# Patient Record
Sex: MALE | Race: WHITE | NOT HISPANIC OR LATINO
[De-identification: names, ages, dates, MRNs, and addresses within clinical notes are randomized per-mention and may not be internally consistent; named-entity substitution may affect disease eponyms.]

---

## 2021-03-01 PROBLEM — Z00.00 ENCOUNTER FOR PREVENTIVE HEALTH EXAMINATION: Status: ACTIVE | Noted: 2021-03-01

## 2021-03-02 ENCOUNTER — APPOINTMENT (OUTPATIENT)
Dept: UROLOGY | Facility: CLINIC | Age: 76
End: 2021-03-02
Payer: MEDICARE

## 2021-03-02 VITALS
HEART RATE: 88 BPM | HEIGHT: 73 IN | BODY MASS INDEX: 25.45 KG/M2 | WEIGHT: 192 LBS | SYSTOLIC BLOOD PRESSURE: 129 MMHG | DIASTOLIC BLOOD PRESSURE: 73 MMHG

## 2021-03-02 DIAGNOSIS — Z78.9 OTHER SPECIFIED HEALTH STATUS: ICD-10-CM

## 2021-03-02 DIAGNOSIS — Z87.39 PERSONAL HISTORY OF OTHER DISEASES OF THE MUSCULOSKELETAL SYSTEM AND CONNECTIVE TISSUE: ICD-10-CM

## 2021-03-02 DIAGNOSIS — Z87.898 PERSONAL HISTORY OF OTHER SPECIFIED CONDITIONS: ICD-10-CM

## 2021-03-02 DIAGNOSIS — N20.0 CALCULUS OF KIDNEY: ICD-10-CM

## 2021-03-02 PROCEDURE — 99203 OFFICE O/P NEW LOW 30 MIN: CPT

## 2021-03-02 NOTE — HISTORY OF PRESENT ILLNESS
[FreeTextEntry1] : This patient presents for evaluation of a PSA elevation.  His PSA was 4.48 last year and is up to 6.29 at this time.  He has some mild but chronic lower urinary tract issues with some frequency and slowing of the stream.

## 2021-03-02 NOTE — END OF VISIT
[FreeTextEntry3] : He will take a course of ciprofloxacin and the PSA will be repeated in 10 days.  He will follow-up with a transrectal ultrasound of the prostate gland following that

## 2021-03-03 ENCOUNTER — NON-APPOINTMENT (OUTPATIENT)
Age: 76
End: 2021-03-03

## 2021-03-03 RX ORDER — CEFUROXIME AXETIL 500 MG/1
500 TABLET ORAL
Qty: 20 | Refills: 0 | Status: DISCONTINUED | COMMUNITY
Start: 2021-03-03 | End: 2021-03-03

## 2021-03-14 LAB
APPEARANCE: CLEAR
BACTERIA: NEGATIVE
BILIRUBIN URINE: NEGATIVE
BLOOD URINE: NEGATIVE
COLOR: NORMAL
GLUCOSE QUALITATIVE U: NEGATIVE
HYALINE CASTS: 0 /LPF
KETONES URINE: NEGATIVE
LEUKOCYTE ESTERASE URINE: NEGATIVE
MICROSCOPIC-UA: NORMAL
NITRITE URINE: NEGATIVE
PH URINE: 6
PROTEIN URINE: NEGATIVE
PSA SERPL-MCNC: 7.81 NG/ML
RED BLOOD CELLS URINE: 1 /HPF
SPECIFIC GRAVITY URINE: 1.01
SQUAMOUS EPITHELIAL CELLS: 0 /HPF
UROBILINOGEN URINE: NORMAL
WHITE BLOOD CELLS URINE: 1 /HPF

## 2021-03-15 LAB — URINE CYTOLOGY: NORMAL

## 2021-03-25 ENCOUNTER — APPOINTMENT (OUTPATIENT)
Dept: UROLOGY | Facility: CLINIC | Age: 76
End: 2021-03-25
Payer: MEDICARE

## 2021-03-25 VITALS
HEIGHT: 73 IN | OXYGEN SATURATION: 99 % | SYSTOLIC BLOOD PRESSURE: 125 MMHG | DIASTOLIC BLOOD PRESSURE: 81 MMHG | WEIGHT: 192 LBS | HEART RATE: 86 BPM | BODY MASS INDEX: 25.45 KG/M2

## 2021-03-25 DIAGNOSIS — N13.8 BENIGN PROSTATIC HYPERPLASIA WITH LOWER URINARY TRACT SYMPMS: ICD-10-CM

## 2021-03-25 DIAGNOSIS — K92.1 MELENA: ICD-10-CM

## 2021-03-25 DIAGNOSIS — N40.1 BENIGN PROSTATIC HYPERPLASIA WITH LOWER URINARY TRACT SYMPMS: ICD-10-CM

## 2021-03-25 PROCEDURE — 76857 US EXAM PELVIC LIMITED: CPT

## 2021-03-25 PROCEDURE — 51741 ELECTRO-UROFLOWMETRY FIRST: CPT

## 2021-03-25 PROCEDURE — 76872 US TRANSRECTAL: CPT

## 2021-03-25 NOTE — HISTORY OF PRESENT ILLNESS
[FreeTextEntry1] : This patient presents today for a transrectal ultrasound of the prostate gland the evaluation of PSA which had risen.  He states that he only took 3 ciprofloxacin before he saw spots of blood in his stool and he assumed that this was the cause of the blood in the stool.  His PSA when checked again it is even higher to 7.81.

## 2021-03-25 NOTE — END OF VISIT
[FreeTextEntry3] : I prescribed doxycycline for 10 days and will repeat the PSA again.  He empties his bladder well but some hypoechoic areas are noted.  I have referred him to Dexter Howard M.D. for further evaluation of the blood in stool which might not actually be from the ciprofloxacin.

## 2021-04-08 ENCOUNTER — NON-APPOINTMENT (OUTPATIENT)
Age: 76
End: 2021-04-08

## 2021-04-08 LAB — PSA SERPL-MCNC: 7.92 NG/ML

## 2021-05-18 ENCOUNTER — APPOINTMENT (OUTPATIENT)
Dept: UROLOGY | Facility: CLINIC | Age: 76
End: 2021-05-18
Payer: MEDICARE

## 2021-05-18 PROCEDURE — 99213 OFFICE O/P EST LOW 20 MIN: CPT

## 2021-05-24 NOTE — HISTORY OF PRESENT ILLNESS
[FreeTextEntry1] : This patient presents for the evaluation of a PSA elevation.  This remains the same after second determination

## 2021-05-24 NOTE — END OF VISIT
[FreeTextEntry3] : Transperineal biopsy was recommended however the patient would prefer to have an MRI first and this was recommended

## 2021-05-27 ENCOUNTER — NON-APPOINTMENT (OUTPATIENT)
Age: 76
End: 2021-05-27

## 2021-05-31 ENCOUNTER — RESULT REVIEW (OUTPATIENT)
Age: 76
End: 2021-05-31

## 2021-05-31 ENCOUNTER — OUTPATIENT (OUTPATIENT)
Dept: OUTPATIENT SERVICES | Facility: HOSPITAL | Age: 76
LOS: 1 days | End: 2021-05-31
Payer: MEDICARE

## 2021-05-31 ENCOUNTER — APPOINTMENT (OUTPATIENT)
Dept: MRI IMAGING | Facility: CLINIC | Age: 76
End: 2021-05-31
Payer: MEDICARE

## 2021-05-31 DIAGNOSIS — R97.20 ELEVATED PROSTATE SPECIFIC ANTIGEN [PSA]: ICD-10-CM

## 2021-05-31 PROCEDURE — A9585: CPT

## 2021-05-31 PROCEDURE — 72197 MRI PELVIS W/O & W/DYE: CPT

## 2021-05-31 PROCEDURE — G1004: CPT

## 2021-05-31 PROCEDURE — 72197 MRI PELVIS W/O & W/DYE: CPT | Mod: 26,ME

## 2021-05-31 PROCEDURE — 76377 3D RENDER W/INTRP POSTPROCES: CPT

## 2021-05-31 PROCEDURE — 76377 3D RENDER W/INTRP POSTPROCES: CPT | Mod: 26

## 2021-06-07 ENCOUNTER — NON-APPOINTMENT (OUTPATIENT)
Age: 76
End: 2021-06-07

## 2021-06-08 ENCOUNTER — NON-APPOINTMENT (OUTPATIENT)
Age: 76
End: 2021-06-08

## 2021-06-18 ENCOUNTER — APPOINTMENT (OUTPATIENT)
Dept: UROLOGY | Facility: CLINIC | Age: 76
End: 2021-06-18
Payer: MEDICARE

## 2021-06-18 DIAGNOSIS — R97.20 ELEVATED PROSTATE, SPECIFIC ANTIGEN [PSA]: ICD-10-CM

## 2021-06-18 PROCEDURE — 99213 OFFICE O/P EST LOW 20 MIN: CPT

## 2021-06-19 PROBLEM — R97.20 PSA ELEVATION: Status: ACTIVE | Noted: 2021-03-02

## 2021-06-19 NOTE — END OF VISIT
[FreeTextEntry3] : PSA ellevation with positive MRI-H wishes to try obseravtion and will repeat the PSA in 4 months

## 2021-09-08 ENCOUNTER — NON-APPOINTMENT (OUTPATIENT)
Age: 76
End: 2021-09-08

## 2021-10-21 ENCOUNTER — NON-APPOINTMENT (OUTPATIENT)
Age: 76
End: 2021-10-21

## 2021-10-21 LAB — PSA SERPL-MCNC: 18 NG/ML

## 2021-10-21 RX ORDER — CIPROFLOXACIN HYDROCHLORIDE 500 MG/1
500 TABLET, FILM COATED ORAL
Qty: 20 | Refills: 0 | Status: COMPLETED | COMMUNITY
Start: 2021-03-02 | End: 2021-10-21

## 2021-10-21 RX ORDER — DOXYCYCLINE HYCLATE 100 MG/1
100 CAPSULE ORAL
Qty: 20 | Refills: 0 | Status: COMPLETED | COMMUNITY
Start: 2021-03-03 | End: 2021-10-21

## 2022-03-04 ENCOUNTER — APPOINTMENT (OUTPATIENT)
Dept: CT IMAGING | Facility: CLINIC | Age: 77
End: 2022-03-04
Payer: MEDICARE

## 2022-03-04 ENCOUNTER — OUTPATIENT (OUTPATIENT)
Dept: OUTPATIENT SERVICES | Facility: HOSPITAL | Age: 77
LOS: 1 days | End: 2022-03-04
Payer: MEDICARE

## 2022-03-04 DIAGNOSIS — R97.20 ELEVATED PROSTATE SPECIFIC ANTIGEN [PSA]: ICD-10-CM

## 2022-03-04 PROCEDURE — 82565 ASSAY OF CREATININE: CPT

## 2022-03-04 PROCEDURE — 74177 CT ABD & PELVIS W/CONTRAST: CPT | Mod: MH

## 2022-03-04 PROCEDURE — 74177 CT ABD & PELVIS W/CONTRAST: CPT | Mod: 26,MH

## 2022-03-13 ENCOUNTER — OUTPATIENT (OUTPATIENT)
Dept: OUTPATIENT SERVICES | Facility: HOSPITAL | Age: 77
LOS: 1 days | Discharge: HOME | End: 2022-03-13
Payer: MEDICARE

## 2022-03-14 ENCOUNTER — NON-APPOINTMENT (OUTPATIENT)
Age: 77
End: 2022-03-14

## 2022-03-14 ENCOUNTER — APPOINTMENT (OUTPATIENT)
Dept: RADIATION ONCOLOGY | Facility: CLINIC | Age: 77
End: 2022-03-14

## 2022-03-14 ENCOUNTER — APPOINTMENT (OUTPATIENT)
Dept: RADIATION ONCOLOGY | Facility: CLINIC | Age: 77
End: 2022-03-14
Payer: MEDICARE

## 2022-03-14 VITALS
BODY MASS INDEX: 24.12 KG/M2 | HEART RATE: 86 BPM | RESPIRATION RATE: 19 BRPM | OXYGEN SATURATION: 97 % | HEIGHT: 73 IN | DIASTOLIC BLOOD PRESSURE: 83 MMHG | SYSTOLIC BLOOD PRESSURE: 128 MMHG | WEIGHT: 182 LBS

## 2022-03-14 DIAGNOSIS — Z80.9 FAMILY HISTORY OF MALIGNANT NEOPLASM, UNSPECIFIED: ICD-10-CM

## 2022-03-14 DIAGNOSIS — C61 MALIGNANT NEOPLASM OF PROSTATE: ICD-10-CM

## 2022-03-14 PROCEDURE — 99204 OFFICE O/P NEW MOD 45 MIN: CPT | Mod: 25

## 2022-03-14 RX ORDER — GENTAMICIN SULFATE 60 MG/100ML
0.6-0.9 INJECTION, SOLUTION INTRAVENOUS
Refills: 0 | Status: ACTIVE | COMMUNITY

## 2022-03-14 RX ORDER — DOXYCYCLINE HYCLATE 100 MG/1
100 TABLET ORAL
Qty: 20 | Refills: 0 | Status: DISCONTINUED | COMMUNITY
Start: 2021-03-25 | End: 2022-03-14

## 2022-03-14 RX ORDER — METHOCARBAMOL/ASPIRIN 400-325 MG
TABLET ORAL
Refills: 0 | Status: ACTIVE | COMMUNITY

## 2022-03-14 NOTE — REVIEW OF SYSTEMS
[IPSS Score (0-40): ___] : IPSS score: [unfilled] [EPIC-CP Score (0-60): ___] : EPIC-CP score: [unfilled] [Constipation: Grade 1 - Occasional or intermittent symptoms; occasional use of stool softeners, laxatives, dietary modification, or enema] : Constipation: Grade 1 - Occasional or intermittent symptoms; occasional use of stool softeners, laxatives, dietary modification, or enema [Diarrhea: Grade 1 - Increase of <4 stools per day over baseline; mild increase in ostomy output compared to baseline] : Diarrhea: Grade 1 - Increase of <4 stools per day over baseline; mild increase in ostomy output compared to baseline [Hematuria: Grade 0] : Hematuria: Grade 0 [Urinary Incontinence: Grade 0] : Urinary Incontinence: Grade 0  [Urinary Retention: Grade 1 - Urinary, suprapubic or intermittent catheter placement not indicated; able to void with some residual] : Urinary Retention: Grade 1 - Urinary, suprapubic or intermittent catheter placement not indicated; able to void with some residual [Urinary Tract Pain: Grade 0] : Urinary Tract Pain: Grade 0 [Urinary Urgency: Grade 0] : Urinary Urgency: Grade 0 [Urinary Frequency: Grade 1 - Present] : Urinary Frequency: Grade 1 - Present [Ejaculation Disorder: Grade 1 - Diminished ejaculation] : Ejaculation Disorder: Grade 1 - Diminished ejaculation  [Erectile Dysfunction: Grade 1 - Decrease in erectile function (frequency or rigidity of erections) but intervention not indicated (e.g., medication or use of mechanical device, penile pump)] : Erectile Dysfunction: Grade 1 - Decrease in erectile function (frequency or rigidity of erections) but intervention not indicated (e.g., medication or use of mechanical device, penile pump)

## 2022-03-14 NOTE — HISTORY OF PRESENT ILLNESS
[FreeTextEntry1] : This is a 77 year old male diagnosed with prostate cancer in October 2021 referred by Dr. Lozano. \par \par He first presented to Dr. Carr in March 2021 with an elevated PSA of 6.29 ng/mL. Urinary frequency and weak stream noted at this time. Repeat PSA performed on 4/8/21 was 7.92 ng/mL. Biopsy was recommended at this time, however patient requested to have an MRI performed. \par \par Prostate MRI performed on 5/31/21 showed a 50 cc prostate. Lesion noted midline, posterior medial, midgland to apex, peripheral zone, measuring 29 x 16 x 19 mm. Tumor bulges, deforms, or obscures capsule- extracapsular extension.  Right-sided neurovascular bundle involvement. No seminal vesicle invasion. \par \par Patient chose active surveillance and to repeat PSA in 4 months. \par \par PSA on 10/19/21 was 18.00 ng/mL. He chose to review his options at this time. He then saw Dr. Johnson and was told on examination he had a "rock hard" prostate. \par \par PSA on 12/6/21 was 21.34 ng/mL.  PSA on 2/25/22 was 26.4.\par \par He had a prostate biopsy on 2/10/22 with Dr. Johnson. Pathology showed adenocarcinoma in 12 out of 12 cores. Shanthi score of 4+4=8 in 8 cores and 4+5=9 in 4 cores. Maximum core involvement 90%. \par \par CT A/P performed 3/4/22 showed no evidence of metastatic disease. However there is an indeterminate small left renal mass and right adrenal nodule versus nodular thickening. Bone scan performed 3/8/22 was negative for metastatic disease. \par \par He is receiving his first Firmagon injection with Dr. Lozano. Urinary frequency and nocturia noted (every 3 hours). Urinary retention noted at times. He presents to discuss the role of radiation therapy in his care.

## 2022-03-14 NOTE — DISEASE MANAGEMENT
[0] : M0 [>20] : >20 ng/mL [Biopsy] : Patient had a biopsy on [9] : Template Biopsy Shanthi Score: 9 [] : Patient had a Prostate MRI [5] : 5 [Extracapsular Extension] : Extracapsular extension [IIIC] : IIIC [BiopsyDate] : 02/22 [MeasuredProstateVolume] : 50 [TotalCores] : 12 [TotalPositiveCores] : 12 [MaxCoreInvolvement] : 90 [CTresults] : neg [BoneScanResults] : neg

## 2022-03-15 ENCOUNTER — NON-APPOINTMENT (OUTPATIENT)
Age: 77
End: 2022-03-15

## 2022-03-15 PROBLEM — C61 PROSTATE CANCER: Status: ACTIVE | Noted: 2022-03-15

## 2022-03-29 ENCOUNTER — NON-APPOINTMENT (OUTPATIENT)
Age: 77
End: 2022-03-29

## 2022-03-30 ENCOUNTER — NON-APPOINTMENT (OUTPATIENT)
Age: 77
End: 2022-03-30

## 2022-03-30 PROCEDURE — 55876 PLACE RT DEVICE/MARKER PROS: CPT

## 2022-03-30 PROCEDURE — 76872 US TRANSRECTAL: CPT | Mod: 26

## 2022-03-30 PROCEDURE — 55874 TPRNL PLMT BIODEGRDABL MATRL: CPT

## 2022-03-30 NOTE — DATA REVIEWED
[FreeTextEntry1] : In preparation for the procedure, he self-administered an enema one hour before leaving home and was NPO the night before procedure. He was prescribed a 3 days course of oral antibiotics twice daily to be started a day prior to the procedure.\par  Topical EUSEBIO cream was applied to the perineal area one hour prior to procedure. Patient was prescribed  Valium 5mg and Tylenol 650 mg upon arrival in the department one hour to procedure. Pt did not want to take Valium prior to procedure but did take the tylenol.\par Procedure risk and benefits were reviewed with patient and a written consent was obtained prior to procedure. A time out was observed with patient name, date of birth, procedure, position, and site verified. \par Patient was placed in a lithotomy position. Chloral prep was used to prep the skin. While maintaining aseptic technique, an ultrasound probe was inserted into the rectum to visualize the prostate. Less than 10 cc of Lidocaine 2% and sodium bicarbonate 8.4% was injected subcutaneously. Afterwards, 20 cc of Lidocaine and sodium bicarbonate was injected internally at the prostate apex and bilateral neurovascular bundles for the nerve block.\par Three fiducial markers were prepared on the sterile field. One fiducial marker was placed into each of the following sites: left lobe base, right lobe base, and apex, via 14 gauge needles under ultrasound guidance. \par Next, the hydrogel spacer kit was opened onto the sterile field and the hydrogel injection apparatus was prepared. An 18 gauge needle was positioned into the mid-line perirectal fat between the anterior rectal wall and prostate under ultrasound guidance. Less than 10 cc of saline was injected via the needle to hydrodissect the space and confirm proper placement in both axial and sagittal views. The syringe was aspirated to confirm the needle was extravascular. The syringe was replaced with the hydrogel injection apparatus and the gel was injected over about 10 seconds. The needle was then removed. There was minimal blood loss. The patient tolerated procedure well.\par Patient was transferred to the recovery area on a monitor. Vital signs were stable. He tolerated fluid and a snack by mouth and was made comfortable. He denied pain. Post procedure instruction were given and reviewed with patient. CT/SIM appointment was given. He was discharged home in a stable condition, accompanied by a friend.\par \par

## 2022-03-31 ENCOUNTER — NON-APPOINTMENT (OUTPATIENT)
Age: 77
End: 2022-03-31

## 2022-04-08 ENCOUNTER — EMERGENCY (EMERGENCY)
Facility: HOSPITAL | Age: 77
LOS: 0 days | Discharge: HOME | End: 2022-04-08
Attending: EMERGENCY MEDICINE
Payer: MEDICARE

## 2022-04-08 VITALS
TEMPERATURE: 98 F | HEIGHT: 72 IN | DIASTOLIC BLOOD PRESSURE: 63 MMHG | RESPIRATION RATE: 18 BRPM | SYSTOLIC BLOOD PRESSURE: 127 MMHG | HEART RATE: 93 BPM | OXYGEN SATURATION: 100 % | WEIGHT: 198.42 LBS

## 2022-04-08 VITALS — HEART RATE: 66 BPM | RESPIRATION RATE: 22 BRPM
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
## 2022-04-08 DIAGNOSIS — S09.90XA UNSPECIFIED INJURY OF HEAD, INITIAL ENCOUNTER: ICD-10-CM

## 2022-04-08 DIAGNOSIS — Z20.822 CONTACT WITH AND (SUSPECTED) EXPOSURE TO COVID-19: ICD-10-CM
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
## 2022-04-08 PROCEDURE — 73000 X-RAY EXAM OF COLLAR BONE: CPT | Mod: LT

## 2022-04-08 PROCEDURE — 73030 X-RAY EXAM OF SHOULDER: CPT | Mod: 26,LT

## 2022-04-08 PROCEDURE — 73030 X-RAY EXAM OF SHOULDER: CPT | Mod: LT

## 2022-04-08 PROCEDURE — 73000 X-RAY EXAM OF COLLAR BONE: CPT | Mod: 26,LT

## 2022-04-08 PROCEDURE — 72125 CT NECK SPINE W/O DYE: CPT | Mod: 26,MA

## 2022-04-08 PROCEDURE — U0005: CPT

## 2022-04-08 PROCEDURE — 70450 CT HEAD/BRAIN W/O DYE: CPT | Mod: 26,MA

## 2022-04-08 PROCEDURE — 99284 EMERGENCY DEPT VISIT MOD MDM: CPT | Mod: 25

## 2022-04-08 PROCEDURE — 99284 EMERGENCY DEPT VISIT MOD MDM: CPT

## 2022-04-08 PROCEDURE — 70450 CT HEAD/BRAIN W/O DYE: CPT | Mod: MA

## 2022-04-08 PROCEDURE — U0003: CPT

## 2022-04-08 PROCEDURE — 71045 X-RAY EXAM CHEST 1 VIEW: CPT | Mod: 26

## 2022-04-08 PROCEDURE — 77263 THER RADIOLOGY TX PLNG CPLX: CPT

## 2022-04-08 PROCEDURE — 72125 CT NECK SPINE W/O DYE: CPT | Mod: MA

## 2022-04-08 PROCEDURE — 71045 X-RAY EXAM CHEST 1 VIEW: CPT

## 2022-04-08 NOTE — ED PROVIDER NOTE - NSFOLLOWUPCLINICS_GEN_ALL_ED_FT
[facility]  Family Medicine  [address] [place] [address]  Phone: [phone]  Fax:

## 2022-04-08 NOTE — ED PROVIDER NOTE - PATIENT PORTAL LINK FT
You can access the FollowMyHealth Patient Portal offered by [facility] by registering at the following website: [email or web address]. By joining [employer]’s FollowMyHealth portal, you will also be able to view your health information using other applications (apps) compatible with our system.

## 2022-04-08 NOTE — ED PROVIDER NOTE - PROGRESS NOTE DETAILS
Brooke Hall for attending Dr. Ramon: XR for chest, clavicle, shoulder show no acute disease Brooke Hall for attending Dr. Ramon: offered pt to do labs and check urine he states it was a mechanical fall would like to go home and he just missed a step, ambulatory to bathroom. Pt lives by himself, offered to see  but decline. reviewed imaging with pt and discharge. pt nontender along clavicle

## 2022-04-08 NOTE — ED PROVIDER NOTE - PHYSICAL EXAMINATION
Constitutional: NAD AAOx3. elderly male laying in bed  Eyes: PERRLA EOMI  Head: Normocephalic atraumatic  Mouth: MMM  Cardiac: regular rate   Resp: Lungs CTAB  GI: Abd s/nt/nd, no rebound or guarding.  Neuro: awake, alert, moving all extremities, cranial nerves 2-12 intact, sensation intact, no dysmetria.  Skin: No rashes

## 2022-04-08 NOTE — ED PROVIDER NOTE - NS ED ROS FT
Constitutional: No fever or chills  Eyes: No visual changes  HEENT: No throat pain  CV: No chest pain  Resp: No SOB no cough  GI: No abd pain, nausea or vomiting  : No dysuria  MSK: +L shoulder pain  Skin: No rash  Neuro: No headache

## 2022-04-08 NOTE — ED PROVIDER NOTE - NSFOLLOWUPINSTRUCTIONS_ED_ALL_ED_FT
Fall Prevention in the Home, Adult      Falls can cause injuries and can affect people from all age groups. There are many simple things that you can do to make your home safe and to help prevent falls. Ask for help when making these changes, if needed.      What actions can I take to prevent falls?    General instructions     •Use good lighting in all rooms. Replace any light bulbs that burn out.      •Turn on lights if it is dark. Use night-lights.      •Place frequently used items in easy-to-reach places. Lower the shelves around your home if necessary.      •Set up furniture so that there are clear paths around it. Avoid moving your furniture around.      •Remove throw rugs and other tripping hazards from the floor.      •Avoid walking on wet floors.      •Fix any uneven floor surfaces.      •Add color or contrast paint or tape to grab bars and handrails in your home. Place contrasting color strips on the first and last steps of stairways.      •When you use a stepladder, make sure that it is completely opened and that the sides are firmly locked. Have someone hold the ladder while you are using it. Do not climb a closed stepladder.      •Be aware of any and all pets.        What can I do in the bathroom?                   •Keep the floor dry. Immediately clean up any water that spills onto the floor.      •Remove soap buildup in the tub or shower on a regular basis.      •Use non-skid mats or decals on the floor of the tub or shower.      •Attach bath mats securely with double-sided, non-slip rug tape.      •If you need to sit down while you are in the shower, use a plastic, non-slip stool.      •Install grab bars by the toilet and in the tub and shower. Do not use towel bars as grab bars.      What can I do in the bedroom?     •Make sure that a bedside light is easy to reach.      • Do not use oversized bedding that drapes onto the floor.      •Have a firm chair that has side arms to use for getting dressed.      What can I do in the kitchen?     •Clean up any spills right away.      •If you need to reach for something above you, use a sturdy step stool that has a grab bar.      •Keep electrical cables out of the way.      • Do not use floor polish or wax that makes floors slippery. If you must use wax, make sure that it is non-skid floor wax.      What can I do in the stairways?     • Do not leave any items on the stairs.      •Make sure that you have a light switch at the top of the stairs and the bottom of the stairs. Have them installed if you do not have them.      •Make sure that there are handrails on both sides of the stairs. Fix handrails that are broken or loose. Make sure that handrails are as long as the stairways.      •Install non-slip stair treads on all stairs in your home.      •Avoid having throw rugs at the top or bottom of stairways, or secure the rugs with carpet tape to prevent them from moving.      •Choose a carpet design that does not hide the edge of steps on the stairway.      •Check any carpeting to make sure that it is firmly attached to the stairs. Fix any carpet that is loose or worn.      What can I do on the outside of my home?     •Use bright outdoor lighting.      •Regularly repair the edges of walkways and driveways and fix any cracks.      •Remove high doorway thresholds.      •Trim any shrubbery on the main path into your home.      •Regularly check that handrails are securely fastened and in good repair. Both sides of any steps should have handrails.      •Install guardrails along the edges of any raised decks or porches.      •Clear walkways of debris and clutter, including tools and rocks.      •Have leaves, snow, and ice cleared regularly.      •Use sand or salt on walkways during winter months.      •In the garage, clean up any spills right away, including grease or oil spills.      What other actions can I take?     •Wear closed-toe shoes that fit well and support your feet. Wear shoes that have rubber soles or low heels.      •Use mobility aids as needed, such as canes, walkers, scooters, and crutches.      •Review your medicines with your health care provider. Some medicines can cause dizziness or changes in blood pressure, which increase your risk of falling.      Talk with your health care provider about other ways that you can decrease your risk of falls. This may include working with a physical therapist or  to improve your strength, balance, and endurance.      Where to find more information    •Centers for Disease Control and Prevention, [employer]AD[employer]: https://www.cdc.gov      •National [place] [employer] Aging: https://ul9bkym.shireen.nih.gov        Contact a health care provider if:    •You are afraid of falling at home.      •You feel weak, drowsy, or dizzy at home.      •You fall at home.        Summary    •There are many simple things that you can do to make your home safe and to help prevent falls.      •Ways to make your home safe include removing tripping hazards and installing grab bars in the bathroom.      •Ask for help when making these changes in your home.      This information is not intended to replace advice given to you by your health care provider. Make sure you discuss any questions you have with your health care provider.      Document Revised: 11/30/2018 Document Reviewed: 08/02/2018    Elsevier Patient Education © 2021 [employer]

## 2022-04-08 NOTE — ED ADULT TRIAGE NOTE - CHIEF COMPLAINT QUOTE
Pt BIBA for mechanical fall at home. Pt states trip and fall off third step striking left shoulder. Denies LOC or blood thinners. No obvious signs of trauma or deformity. MD Del Valle at bedside for evaluation. brought to CT.

## 2022-04-13 PROCEDURE — 77332 RADIATION TREATMENT AID(S): CPT

## 2022-04-13 NOTE — ED POST DISCHARGE NOTE - DETAILS
I spoke to pt, he will speak to his oncologist at his appt on 4/18 regarding cxr or CT. KRUNAL Jeronimo

## 2022-04-18 PROCEDURE — 77300 RADIATION THERAPY DOSE PLAN: CPT

## 2022-04-18 PROCEDURE — 77338 DESIGN MLC DEVICE FOR IMRT: CPT

## 2022-04-18 PROCEDURE — 77301 RADIOTHERAPY DOSE PLAN IMRT: CPT

## 2022-04-19 PROCEDURE — 77331 SPECIAL RADIATION DOSIMETRY: CPT

## 2022-05-04 PROCEDURE — G6002: CPT

## 2022-05-04 PROCEDURE — 77427 RADIATION TX MANAGEMENT X5: CPT

## 2022-05-04 PROCEDURE — G6015: CPT

## 2022-05-05 PROCEDURE — G6002: CPT

## 2022-05-05 PROCEDURE — G6015: CPT

## 2022-05-06 PROCEDURE — G6015: CPT

## 2022-05-06 PROCEDURE — G6002: CPT

## 2022-05-09 ENCOUNTER — NON-APPOINTMENT (OUTPATIENT)
Age: 77
End: 2022-05-09

## 2022-05-09 VITALS — RESPIRATION RATE: 18 BRPM | BODY MASS INDEX: 24.12 KG/M2 | HEART RATE: 76 BPM | HEIGHT: 73 IN | WEIGHT: 182 LBS

## 2022-05-09 PROCEDURE — G6015: CPT

## 2022-05-09 PROCEDURE — G6002: CPT

## 2022-05-09 NOTE — HISTORY OF PRESENT ILLNESS
[FreeTextEntry1] : This is a 77 year old male diagnosed with prostate cancer in October 2021 referred by Dr. Lozano. \par \par He first presented to Dr. Carr in March 2021 with an elevated PSA of 6.29 ng/mL. Urinary frequency and weak stream noted at this time. Repeat PSA performed on 4/8/21 was 7.92 ng/mL. Biopsy was recommended at this time, however patient requested to have an MRI performed. \par \par Prostate MRI performed on 5/31/21 showed a 50 cc prostate. Lesion noted midline, posterior medial, midgland to apex, peripheral zone, measuring 29 x 16 x 19 mm. Tumor bulges, deforms, or obscures capsule- extracapsular extension.  Right-sided neurovascular bundle involvement. No seminal vesicle invasion. \par \par Patient chose active surveillance and to repeat PSA in 4 months. \par \par PSA on 10/19/21 was 18.00 ng/mL. He chose to review his options at this time. He then saw Dr. Johnson and was told on examination he had a "rock hard" prostate. \par \par PSA on 12/6/21 was 21.34 ng/mL.  PSA on 2/25/22 was 26.4.\par \par He had a prostate biopsy on 2/10/22 with Dr. Johnson. Pathology showed adenocarcinoma in 12 out of 12 cores. Shanthi score of 4+4=8 in 8 cores and 4+5=9 in 4 cores. Maximum core involvement 90%. \par \par CT A/P performed 3/4/22 showed no evidence of metastatic disease. However there is an indeterminate small left renal mass and right adrenal nodule versus nodular thickening. Bone scan performed 3/8/22 was negative for metastatic disease. \par \par He is receiving his first Firmagon injection with Dr. Lozano. Urinary frequency and nocturia noted (every 3 hours). Urinary retention noted at times. He is currently undergoing RT.\par \par He presents for an OTV 3/26 fx. Denies urinary symptoms/side effects, no pain.

## 2022-05-09 NOTE — DISEASE MANAGEMENT
[Pathological] : TNM Stage: p [IIIC] : IIIC [TTNM] : 1 [NTNM] : 0 [MTNM] : 0 [de-identified] : 674 [de-identified] : 1081 [de-identified] : Prostate/SV/Nodes

## 2022-05-10 PROCEDURE — G6002: CPT

## 2022-05-10 PROCEDURE — 77336 RADIATION PHYSICS CONSULT: CPT

## 2022-05-10 PROCEDURE — G6015: CPT

## 2022-05-11 PROCEDURE — G6015: CPT

## 2022-05-11 PROCEDURE — 77427 RADIATION TX MANAGEMENT X5: CPT

## 2022-05-11 PROCEDURE — G6002: CPT

## 2022-05-12 PROCEDURE — G6015: CPT

## 2022-05-12 PROCEDURE — 77014: CPT

## 2022-05-13 PROCEDURE — G6002: CPT

## 2022-05-13 PROCEDURE — G6015: CPT

## 2022-05-16 ENCOUNTER — NON-APPOINTMENT (OUTPATIENT)
Age: 77
End: 2022-05-16

## 2022-05-16 VITALS
HEART RATE: 72 BPM | RESPIRATION RATE: 18 BRPM | HEIGHT: 73 IN | SYSTOLIC BLOOD PRESSURE: 108 MMHG | DIASTOLIC BLOOD PRESSURE: 60 MMHG | WEIGHT: 187 LBS | OXYGEN SATURATION: 99 % | BODY MASS INDEX: 24.78 KG/M2

## 2022-05-16 PROCEDURE — G6002: CPT

## 2022-05-16 PROCEDURE — G6015: CPT

## 2022-05-16 NOTE — PHYSICAL EXAM
[FreeTextEntry1] : WDWN.  In no acute distress.\par Skin in RT portals intact.\par No erythema.\par

## 2022-05-16 NOTE — DISEASE MANAGEMENT
[Pathological] : TNM Stage: p [IIIC] : IIIC [TTNM] : 1 [NTNM] : 0 [MTNM] : 0 [[place] [id number]] : 3718 [de-identified] : 9684 [de-identified] : Prostate/SV/Nodes

## 2022-05-16 NOTE — REVIEW OF SYSTEMS
[Constipation: Grade 1 - Occasional or intermittent symptoms; occasional use of stool softeners, laxatives, dietary modification, or enema] : Constipation: Grade 1 - Occasional or intermittent symptoms; occasional use of stool softeners, laxatives, dietary modification, or enema [Urinary Frequency: Grade 1 - Present] : Urinary Frequency: Grade 1 - Present

## 2022-05-17 PROCEDURE — G6015: CPT

## 2022-05-17 PROCEDURE — 77336 RADIATION PHYSICS CONSULT: CPT

## 2022-05-17 PROCEDURE — G6002: CPT

## 2022-05-18 PROCEDURE — 77427 RADIATION TX MANAGEMENT X5: CPT

## 2022-05-18 PROCEDURE — G6002: CPT

## 2022-05-18 PROCEDURE — G6015: CPT

## 2022-05-19 PROCEDURE — G6015: CPT

## 2022-05-19 PROCEDURE — 77014: CPT

## 2022-05-20 PROCEDURE — G6002: CPT

## 2022-05-20 PROCEDURE — G6015: CPT

## 2022-05-23 ENCOUNTER — NON-APPOINTMENT (OUTPATIENT)
Age: 77
End: 2022-05-23

## 2022-05-23 VITALS
WEIGHT: 187 LBS | OXYGEN SATURATION: 98 % | HEART RATE: 79 BPM | HEIGHT: 73 IN | BODY MASS INDEX: 24.78 KG/M2 | SYSTOLIC BLOOD PRESSURE: 110 MMHG | RESPIRATION RATE: 18 BRPM | DIASTOLIC BLOOD PRESSURE: 70 MMHG

## 2022-05-23 PROCEDURE — G6015: CPT

## 2022-05-23 PROCEDURE — G6002: CPT

## 2022-05-23 NOTE — HISTORY OF PRESENT ILLNESS
[FreeTextEntry1] : This is a 77 year old male diagnosed with prostate cancer in October 2021 referred by Dr. Lozano. \par \par He first presented to Dr. Carr in March 2021 with an elevated PSA of 6.29 ng/mL. Urinary frequency and weak stream noted at this time. Repeat PSA performed on 4/8/21 was 7.92 ng/mL. Biopsy was recommended at this time, however patient requested to have an MRI performed. \par \par Prostate MRI performed on 5/31/21 showed a 50 cc prostate. Lesion noted midline, posterior medial, midgland to apex, peripheral zone, measuring 29 x 16 x 19 mm. Tumor bulges, deforms, or obscures capsule- extracapsular extension.  Right-sided neurovascular bundle involvement. No seminal vesicle invasion. \par \par Patient chose active surveillance and to repeat PSA in 4 months. \par \par PSA on 10/19/21 was 18.00 ng/mL. He chose to review his options at this time. He then saw Dr. Johnson and was told on examination he had a "rock hard" prostate. \par \par PSA on 12/6/21 was 21.34 ng/mL.  PSA on 2/25/22 was 26.4.\par \par He had a prostate biopsy on 2/10/22 with Dr. Johnson. Pathology showed adenocarcinoma in 12 out of 12 cores. Shanthi score of 4+4=8 in 8 cores and 4+5=9 in 4 cores. Maximum core involvement 90%. \par \par CT A/P performed 3/4/22 showed no evidence of metastatic disease. However there is an indeterminate small left renal mass and right adrenal nodule versus nodular thickening. Bone scan performed 3/8/22 was negative for metastatic disease. \par \par He is receiving his first Firmagon injection with Dr. Lozano. Urinary frequency and nocturia noted (every 3 hours). Urinary retention noted at times. He is currently undergoing RT.\par \par He presents for an OTV 14/26 fx. Urinary frequency noted. Chronic constipation. He saw Dr. Lozano who gave him anticonstipation medications. States he does have some loose stools at times. He received Firmagon injection 5/16/22 with Dr. Lozano.

## 2022-05-23 NOTE — REVIEW OF SYSTEMS
[Constipation: Grade 1 - Occasional or intermittent symptoms; occasional use of stool softeners, laxatives, dietary modification, or enema] : Constipation: Grade 1 - Occasional or intermittent symptoms; occasional use of stool softeners, laxatives, dietary modification, or enema [Diarrhea: Grade 1 - Increase of <4 stools per day over baseline; mild increase in ostomy output compared to baseline] : Diarrhea: Grade 1 - Increase of <4 stools per day over baseline; mild increase in ostomy output compared to baseline [Hematuria: Grade 0] : Hematuria: Grade 0 [Urinary Incontinence: Grade 0] : Urinary Incontinence: Grade 0  [Urinary Retention: Grade 0] : Urinary Retention: Grade 0 [Urinary Tract Pain: Grade 0] : Urinary Tract Pain: Grade 0 [Urinary Urgency: Grade 0] : Urinary Urgency: Grade 0 [Urinary Frequency: Grade 1 - Present] : Urinary Frequency: Grade 1 - Present

## 2022-05-23 NOTE — DISEASE MANAGEMENT
[Pathological] : TNM Stage: p [IIIC] : IIIC [TTNM] : 1 [NTNM] : 0 [MTNM] : 0 [de-identified] : 0260 [de-identified] : 0455 [de-identified] : Prostate/SV/Nodes

## 2022-05-24 PROCEDURE — 77336 RADIATION PHYSICS CONSULT: CPT

## 2022-05-24 PROCEDURE — G6015: CPT

## 2022-05-24 PROCEDURE — G6002: CPT

## 2022-05-25 PROCEDURE — G6002: CPT

## 2022-05-25 PROCEDURE — 77427 RADIATION TX MANAGEMENT X5: CPT

## 2022-05-25 PROCEDURE — G6015: CPT

## 2022-05-26 PROCEDURE — G6015: CPT

## 2022-05-26 PROCEDURE — 77014: CPT

## 2022-05-27 PROCEDURE — G6002: CPT

## 2022-05-27 PROCEDURE — G6015: CPT

## 2022-05-31 PROCEDURE — G6015: CPT

## 2022-05-31 PROCEDURE — G6002: CPT

## 2022-06-01 ENCOUNTER — NON-APPOINTMENT (OUTPATIENT)
Age: 77
End: 2022-06-01

## 2022-06-01 VITALS
RESPIRATION RATE: 19 BRPM | WEIGHT: 187 LBS | DIASTOLIC BLOOD PRESSURE: 64 MMHG | HEART RATE: 73 BPM | SYSTOLIC BLOOD PRESSURE: 110 MMHG | HEIGHT: 73 IN | OXYGEN SATURATION: 98 % | BODY MASS INDEX: 24.78 KG/M2

## 2022-06-01 PROCEDURE — 77336 RADIATION PHYSICS CONSULT: CPT

## 2022-06-01 PROCEDURE — G6015: CPT

## 2022-06-01 PROCEDURE — G6002: CPT

## 2022-06-01 NOTE — DISEASE MANAGEMENT
[Pathological] : TNM Stage: p [IIIC] : IIIC [TTNM] : 1 [NTNM] : 0 [MTNM] : 0 [de-identified] : 2369 [de-identified] : 4166 [de-identified] : Prostate/SV/Nodes

## 2022-06-01 NOTE — REVIEW OF SYSTEMS
[Constipation: Grade 1 - Occasional or intermittent symptoms; occasional use of stool softeners, laxatives, dietary modification, or enema] : Constipation: Grade 1 - Occasional or intermittent symptoms; occasional use of stool softeners, laxatives, dietary modification, or enema [Diarrhea: Grade 1 - Increase of <4 stools per day over baseline; mild increase in ostomy output compared to baseline] : Diarrhea: Grade 1 - Increase of <4 stools per day over baseline; mild increase in ostomy output compared to baseline

## 2022-06-01 NOTE — HISTORY OF PRESENT ILLNESS
[FreeTextEntry1] : This is a 77 year old male diagnosed with prostate cancer in October 2021 referred by Dr. Lozano. \par \par He first presented to Dr. Carr in March 2021 with an elevated PSA of 6.29 ng/mL. Urinary frequency and weak stream noted at this time. Repeat PSA performed on 4/8/21 was 7.92 ng/mL. Biopsy was recommended at this time, however patient requested to have an MRI performed. \par \par Prostate MRI performed on 5/31/21 showed a 50 cc prostate. Lesion noted midline, posterior medial, midgland to apex, peripheral zone, measuring 29 x 16 x 19 mm. Tumor bulges, deforms, or obscures capsule- extracapsular extension.  Right-sided neurovascular bundle involvement. No seminal vesicle invasion. \par \par Patient chose active surveillance and to repeat PSA in 4 months. \par \par PSA on 10/19/21 was 18.00 ng/mL. He chose to review his options at this time. He then saw Dr. Johnson and was told on examination he had a "rock hard" prostate. \par \par PSA on 12/6/21 was 21.34 ng/mL.  PSA on 2/25/22 was 26.4.\par \par He had a prostate biopsy on 2/10/22 with Dr. Johnson. Pathology showed adenocarcinoma in 12 out of 12 cores. Shanthi score of 4+4=8 in 8 cores and 4+5=9 in 4 cores. Maximum core involvement 90%. \par \par CT A/P performed 3/4/22 showed no evidence of metastatic disease. However there is an indeterminate small left renal mass and right adrenal nodule versus nodular thickening. Bone scan performed 3/8/22 was negative for metastatic disease. \par \par He is receiving his first Firmagon injection with Dr. Lozano. Urinary frequency and nocturia noted (every 3 hours). Urinary retention noted at times. He is currently undergoing RT.\par \par He presents for an OTV 20/26 fx. Urinary frequency noted. Chronic constipation. He saw Dr. Lozano last week who gave him anticonstipation medications. States he does have some loose stools at times. He received Firmagon injection 5/16/22 with Dr. Lozano.

## 2022-06-02 ENCOUNTER — NON-APPOINTMENT (OUTPATIENT)
Age: 77
End: 2022-06-02

## 2022-06-02 PROCEDURE — G6002: CPT

## 2022-06-02 PROCEDURE — 77427 RADIATION TX MANAGEMENT X5: CPT

## 2022-06-02 PROCEDURE — G6015: CPT

## 2022-06-03 PROCEDURE — G6002: CPT

## 2022-06-03 PROCEDURE — G6015: CPT

## 2022-06-06 ENCOUNTER — NON-APPOINTMENT (OUTPATIENT)
Age: 77
End: 2022-06-06

## 2022-06-06 VITALS
BODY MASS INDEX: 24.78 KG/M2 | OXYGEN SATURATION: 98 % | HEART RATE: 70 BPM | DIASTOLIC BLOOD PRESSURE: 70 MMHG | RESPIRATION RATE: 18 BRPM | HEIGHT: 73 IN | WEIGHT: 187 LBS | SYSTOLIC BLOOD PRESSURE: 118 MMHG

## 2022-06-06 PROCEDURE — G6015: CPT

## 2022-06-06 PROCEDURE — 77014: CPT

## 2022-06-06 NOTE — HISTORY OF PRESENT ILLNESS
[FreeTextEntry1] : This is a 77 year old male diagnosed with prostate cancer in October 2021 referred by Dr. Lozano. \par \par He first presented to Dr. Carr in March 2021 with an elevated PSA of 6.29 ng/mL. Urinary frequency and weak stream noted at this time. Repeat PSA performed on 4/8/21 was 7.92 ng/mL. Biopsy was recommended at this time, however patient requested to have an MRI performed. \par \par Prostate MRI performed on 5/31/21 showed a 50 cc prostate. Lesion noted midline, posterior medial, midgland to apex, peripheral zone, measuring 29 x 16 x 19 mm. Tumor bulges, deforms, or obscures capsule- extracapsular extension.  Right-sided neurovascular bundle involvement. No seminal vesicle invasion. \par \par Patient chose active surveillance and to repeat PSA in 4 months. \par \par PSA on 10/19/21 was 18.00 ng/mL. He chose to review his options at this time. He then saw Dr. Johnson and was told on examination he had a "rock hard" prostate. \par \par PSA on 12/6/21 was 21.34 ng/mL.  PSA on 2/25/22 was 26.4.\par \par He had a prostate biopsy on 2/10/22 with Dr. Johnson. Pathology showed adenocarcinoma in 12 out of 12 cores. Shanthi score of 4+4=8 in 8 cores and 4+5=9 in 4 cores. Maximum core involvement 90%. \par \par CT A/P performed 3/4/22 showed no evidence of metastatic disease. However there is an indeterminate small left renal mass and right adrenal nodule versus nodular thickening. Bone scan performed 3/8/22 was negative for metastatic disease. \par \par He is receiving his first Firmagon injection with Dr. Lozano. Urinary frequency and nocturia noted (every 3 hours). Urinary retention noted at times. He is currently undergoing RT.\par \par He presents for an OTV 23/26 fx. Urinary frequency noted. Chronic constipation. Taking Senna which he states is helping. States he does have some loose stools at times. Met with nutritionist last week. He received Firmagon injection 5/16/22 with Dr. Lozano.

## 2022-06-06 NOTE — DISEASE MANAGEMENT
[Pathological] : TNM Stage: p [IIIC] : IIIC [TTNM] : 1 [NTNM] : 0 [MTNM] : 0 [de-identified] : 2653 [de-identified] : 6927 [de-identified] : Prostate/SV/Nodes

## 2022-06-07 PROCEDURE — G6015: CPT

## 2022-06-07 PROCEDURE — G6002: CPT

## 2022-06-08 PROCEDURE — 77336 RADIATION PHYSICS CONSULT: CPT

## 2022-06-08 PROCEDURE — G6015: CPT

## 2022-06-08 PROCEDURE — G6002: CPT

## 2022-06-09 ENCOUNTER — NON-APPOINTMENT (OUTPATIENT)
Age: 77
End: 2022-06-09

## 2022-06-09 PROBLEM — Z78.9 OTHER SPECIFIED HEALTH STATUS: Chronic | Status: ACTIVE | Noted: 2022-04-13

## 2022-06-09 PROCEDURE — G6002: CPT

## 2022-06-09 PROCEDURE — G6015: CPT

## 2022-07-13 ENCOUNTER — APPOINTMENT (OUTPATIENT)
Dept: RADIATION ONCOLOGY | Facility: CLINIC | Age: 77
End: 2022-07-13

## 2022-07-13 VITALS
BODY MASS INDEX: 23.88 KG/M2 | WEIGHT: 181 LBS | HEART RATE: 79 BPM | DIASTOLIC BLOOD PRESSURE: 74 MMHG | OXYGEN SATURATION: 97 % | RESPIRATION RATE: 16 BRPM | SYSTOLIC BLOOD PRESSURE: 124 MMHG

## 2022-07-13 PROCEDURE — 99024 POSTOP FOLLOW-UP VISIT: CPT

## 2022-07-13 NOTE — DISEASE MANAGEMENT
[1] : T1 [0] : M0 [>20] : >20 ng/mL [Biopsy] : Patient had a biopsy on [9] : Template Biopsy Shanthi Score: 9 [] : Patient had a Prostate MRI [IIIC] : IIIC [Active Surveillance] : Active Surveillance [Radiation Therapy] : Migrate  from Active Surveillance  to Radiation Therapy [Treatment with radiation therapy] : Treatment with radiation therapy [BiopsyDate] : 02/22 [TotalCores] : 12 [TotalPositiveCores] : 12 [MaxCoreInvolvement] : 90 [RadiationCompletedDate] : 06/22 [EBRTDose] : 1865 [EBRTFractions] : 26

## 2022-07-13 NOTE — REVIEW OF SYSTEMS
[Fatigue] : fatigue [Constipation: Grade 1 - Occasional or intermittent symptoms; occasional use of stool softeners, laxatives, dietary modification, or enema] : Constipation: Grade 1 - Occasional or intermittent symptoms; occasional use of stool softeners, laxatives, dietary modification, or enema [Hematuria: Grade 0] : Hematuria: Grade 0 [Urinary Incontinence: Grade 0] : Urinary Incontinence: Grade 0  [Urinary Retention: Grade 0] : Urinary Retention: Grade 0 [Urinary Tract Pain: Grade 0] : Urinary Tract Pain: Grade 0 [Urinary Urgency: Grade 0] : Urinary Urgency: Grade 0 [Urinary Frequency: Grade 1 - Present] : Urinary Frequency: Grade 1 - Present

## 2022-07-13 NOTE — HISTORY OF PRESENT ILLNESS
[FreeTextEntry1] : This is a 77 year old male diagnosed with prostate cancer in October 2021 referred by Dr. Lozano. \par \par He first presented to Dr. Carr in March 2021 with an elevated PSA of 6.29 ng/mL. Urinary frequency and weak stream noted at this time. Repeat PSA performed on 4/8/21 was 7.92 ng/mL. Biopsy was recommended at this time, however patient requested to have an MRI performed. \par \par Prostate MRI performed on 5/31/21 showed a 50 cc prostate. Lesion noted midline, posterior medial, midgland to apex, peripheral zone, measuring 29 x 16 x 19 mm. Tumor bulges, deforms, or obscures capsule- extracapsular extension.  Right-sided neurovascular bundle involvement. No seminal vesicle invasion. \par \par Patient chose active surveillance and to repeat PSA in 4 months. \par \par PSA on 10/19/21 was 18.00 ng/mL. He chose to review his options at this time. He then saw Dr. Johnson and was told on examination he had a "rock hard" prostate. \par \par PSA on 12/6/21 was 21.34 ng/mL.  PSA on 2/25/22 was 26.4.\par \par He had a prostate biopsy on 2/10/22 with Dr. Johnson. Pathology showed adenocarcinoma in 12 out of 12 cores. Shanthi score of 4+4=8 in 8 cores and 4+5=9 in 4 cores. Maximum core involvement 90%. \par \par CT A/P performed 3/4/22 showed no evidence of metastatic disease. However there is an indeterminate small left renal mass and right adrenal nodule versus nodular thickening. Bone scan performed 3/8/22 was negative for metastatic disease. \par \par He is receiving his first Firmagon injection with Dr. Lozano. Urinary frequency and nocturia noted (every 3 hours). Urinary retention noted at times. \par \par He was assessed to have prostate adenoca, stage IIC, T3N0M0, grade group 5, [place] score 9, 12 of 12 cores positive, PSA of 26.4. Patient has high volume, very high risk disease. He completed RT total dose of 7000 cGy in 26 fx to the prostate/SV/Nodes on 6/8/22. \par \par He presents for a one month PTE. Urinary frequency noted. Nocturia 3-4x. Denies hematuria or dysuria. Fatigue noted. Chronic constipation. Saw GI and was recommended to have a colonoscopy. Met with nutritionist last week. He received Firmagon injection 5/16/22 and 7/11/22 with Dr. Lozano.

## 2022-07-14 LAB — PSA SERPL-MCNC: 6.52 NG/ML

## 2022-11-10 ENCOUNTER — APPOINTMENT (OUTPATIENT)
Dept: RADIATION ONCOLOGY | Facility: CLINIC | Age: 77
End: 2022-11-10

## 2022-11-10 ENCOUNTER — NON-APPOINTMENT (OUTPATIENT)
Age: 77
End: 2022-11-10

## 2022-11-10 VITALS
OXYGEN SATURATION: 98 % | HEART RATE: 70 BPM | WEIGHT: 179 LBS | DIASTOLIC BLOOD PRESSURE: 64 MMHG | BODY MASS INDEX: 23.72 KG/M2 | HEIGHT: 73 IN | RESPIRATION RATE: 18 BRPM | SYSTOLIC BLOOD PRESSURE: 110 MMHG

## 2022-11-10 PROCEDURE — 99214 OFFICE O/P EST MOD 30 MIN: CPT

## 2022-11-10 RX ORDER — DIAZEPAM 5 MG/1
5 TABLET ORAL
Qty: 1 | Refills: 0 | Status: DISCONTINUED | COMMUNITY
Start: 2022-03-15 | End: 2022-11-10

## 2022-11-10 RX ORDER — METHOCARBAMOL 750 MG/1
750 TABLET, FILM COATED ORAL
Qty: 360 | Refills: 0 | Status: ACTIVE | COMMUNITY
Start: 2022-10-14

## 2022-11-10 RX ORDER — CIPROFLOXACIN HYDROCHLORIDE 500 MG/1
500 TABLET, FILM COATED ORAL
Qty: 10 | Refills: 0 | Status: DISCONTINUED | COMMUNITY
Start: 2022-03-22 | End: 2022-11-10

## 2022-11-10 NOTE — REVIEW OF SYSTEMS
[Constipation: Grade 1 - Occasional or intermittent symptoms; occasional use of stool softeners, laxatives, dietary modification, or enema] : Constipation: Grade 1 - Occasional or intermittent symptoms; occasional use of stool softeners, laxatives, dietary modification, or enema [Hematuria: Grade 0] : Hematuria: Grade 0 [Urinary Incontinence: Grade 0] : Urinary Incontinence: Grade 0  [Urinary Retention: Grade 0] : Urinary Retention: Grade 0 [Urinary Tract Pain: Grade 0] : Urinary Tract Pain: Grade 0 [Urinary Urgency: Grade 0] : Urinary Urgency: Grade 0 [Urinary Frequency: Grade 1 - Present] : Urinary Frequency: Grade 1 - Present

## 2022-11-10 NOTE — HISTORY OF PRESENT ILLNESS
[FreeTextEntry1] : This is a 77 year old male diagnosed with prostate cancer in October 2021 referred by Dr. Lozano. \par \par He first presented to Dr. Carr in March 2021 with an elevated PSA of 6.29 ng/mL. Urinary frequency and weak stream noted at this time. Repeat PSA performed on 4/8/21 was 7.92 ng/mL. Biopsy was recommended at this time, however patient requested to have an MRI performed. \par \par Prostate MRI performed on 5/31/21 showed a 50 cc prostate. Lesion noted midline, posterior medial, midgland to apex, peripheral zone, measuring 29 x 16 x 19 mm. Tumor bulges, deforms, or obscures capsule- extracapsular extension.  Right-sided neurovascular bundle involvement. No seminal vesicle invasion. \par \par Patient chose active surveillance and to repeat PSA in 4 months. \par \par PSA on 10/19/21 was 18.00 ng/mL. He chose to review his options at this time. He then saw Dr. Johnson and was told on examination he had a "rock hard" prostate. \par \par PSA on 12/6/21 was 21.34 ng/mL.  PSA on 2/25/22 was 26.4.\par \par He had a prostate biopsy on 2/10/22 with Dr. Johnson. Pathology showed adenocarcinoma in 12 out of 12 cores. Shanthi score of 4+4=8 in 8 cores and 4+5=9 in 4 cores. Maximum core involvement 90%. \par \par CT A/P performed 3/4/22 showed no evidence of metastatic disease. However there is an indeterminate small left renal mass and right adrenal nodule versus nodular thickening. Bone scan performed 3/8/22 was negative for metastatic disease. \par \par He is receiving his first Firmagon injection with Dr. Lozano. Urinary frequency and nocturia noted (every 3 hours). Urinary retention noted at times. \par \par He was assessed to have prostate adenoca, stage IIC, T3N0M0, grade group 5, [place] score 9, 12 of 12 cores positive, PSA of 26.4. Patient has high volume, very high risk disease. He completed RT total dose of 7000 cGy in 26 fx to the prostate/SV/Nodes on 6/8/22. \par \par PSA on 7/13/22 was 6.52. \par \par He presents for a 5 month follow up. Nocturia 3-4x. Denies hematuria or dysuria. Chronic constipation. Saw GI and was recommended to have a colonoscopy, underwent Cologuard which was negative. States he is taking herbal laxatives. He received Firmagon injection 5/16/22, 7/11/22, and 11/1/22 with Dr. Lozano.

## 2022-11-10 NOTE — DISEASE MANAGEMENT
[BiopsyDate] : 02/22 [TotalCores] : 12 [TotalPositiveCores] : 12 [MaxCoreInvolvement] : 90 [RadiationCompletedDate] : 06/22 [EBRTDose] : 7537 [EBRTFractions] : 26

## 2022-11-14 LAB — PSA SERPL-MCNC: 1.99 NG/ML

## 2023-05-08 ENCOUNTER — APPOINTMENT (OUTPATIENT)
Dept: RADIATION ONCOLOGY | Facility: CLINIC | Age: 78
End: 2023-05-08
Payer: MEDICARE

## 2023-05-08 VITALS
RESPIRATION RATE: 20 BRPM | DIASTOLIC BLOOD PRESSURE: 68 MMHG | HEART RATE: 73 BPM | OXYGEN SATURATION: 98 % | HEIGHT: 73 IN | SYSTOLIC BLOOD PRESSURE: 120 MMHG | WEIGHT: 185 LBS | BODY MASS INDEX: 24.52 KG/M2

## 2023-05-08 PROCEDURE — 99213 OFFICE O/P EST LOW 20 MIN: CPT

## 2023-05-08 NOTE — HISTORY OF PRESENT ILLNESS
[FreeTextEntry1] : This is a 78 year old male diagnosed with prostate cancer in October 2021 referred by Dr. Lozano. \par \par He first presented to Dr. Carr in March 2021 with an elevated PSA of 6.29 ng/mL. Urinary frequency and weak stream noted at this time. Repeat PSA performed on 4/8/21 was 7.92 ng/mL. Biopsy was recommended at this time, however patient requested to have an MRI performed. \par \par Prostate MRI performed on 5/31/21 showed a 50 cc prostate. Lesion noted midline, posterior medial, midgland to apex, peripheral zone, measuring 29 x 16 x 19 mm. Tumor bulges, deforms, or obscures capsule- extracapsular extension.  Right-sided neurovascular bundle involvement. No seminal vesicle invasion. \par \par Patient chose active surveillance and to repeat PSA in 4 months. \par \par PSA on 10/19/21 was 18.00 ng/mL. He chose to review his options at this time. He then saw Dr. Johnson and was told on examination he had a "rock hard" prostate. \par \par PSA on 12/6/21 was 21.34 ng/mL.  PSA on 2/25/22 was 26.4.\par \par He had a prostate biopsy on 2/10/22 with Dr. Johnson. Pathology showed adenocarcinoma in 12 out of 12 cores. Shanthi score of 4+4=8 in 8 cores and 4+5=9 in 4 cores. Maximum core involvement 90%. \par \par CT A/P performed 3/4/22 showed no evidence of metastatic disease. However there is an indeterminate small left renal mass and right adrenal nodule versus nodular thickening. Bone scan performed 3/8/22 was negative for metastatic disease. \par \par He is receiving his first Firmagon injection with Dr. Lozano. Urinary frequency and nocturia noted (every 3 hours). Urinary retention noted at times. \par \par He was assessed to have prostate adenoca, stage IIC, T3N0M0, grade group 5, [place] score 9, 12 of 12 cores positive, PSA of 26.4. Patient has high volume, very high risk disease. He completed RT total dose of 7000 cGy in 26 fx to the prostate/SV/Nodes on 6/8/22. \par \par PSA on 7/13/22 was 6.52. \par PSA on 11/10/22 was 1.99.\par \par He presents for an 11 month follow up. Nocturia 3-4x. Denies hematuria or dysuria. Chronic constipation. Saw GI and was recommended to have a colonoscopy, underwent Cologuard which was negative. States he is taking herbal laxatives. He next Firmagon injection is on 5/16/23. First injection given 5/16/22 with Dr. Lozano. States he had a PSA with Dr. Lozano last month and he thinks it was 0.7.

## 2023-05-08 NOTE — DISEASE MANAGEMENT
[1] : T1 [0] : M0 [>20] : >20 ng/mL [Biopsy] : Patient had a biopsy on [9] : Template Biopsy Shanthi Score: 9 [] : Patient had a Prostate MRI [IIIC] : IIIC [Active Surveillance] : Active Surveillance [Radiation Therapy] : Migrate  from Active Surveillance  to Radiation Therapy [Treatment with radiation therapy] : Treatment with radiation therapy [BiopsyDate] : 02/22 [TotalCores] : 12 [TotalPositiveCores] : 12 [MaxCoreInvolvement] : 90 [RadiationCompletedDate] : 06/22 [EBRTDose] : 4345 [EBRTFractions] : 26

## 2023-11-08 ENCOUNTER — APPOINTMENT (OUTPATIENT)
Dept: RADIATION ONCOLOGY | Facility: CLINIC | Age: 78
End: 2023-11-08

## 2023-11-16 ENCOUNTER — APPOINTMENT (OUTPATIENT)
Dept: RADIATION ONCOLOGY | Facility: CLINIC | Age: 78
End: 2023-11-16
Payer: MEDICARE

## 2023-11-16 ENCOUNTER — NON-APPOINTMENT (OUTPATIENT)
Age: 78
End: 2023-11-16

## 2023-11-16 VITALS
OXYGEN SATURATION: 95 % | WEIGHT: 190 LBS | HEIGHT: 73 IN | RESPIRATION RATE: 18 BRPM | DIASTOLIC BLOOD PRESSURE: 74 MMHG | BODY MASS INDEX: 25.18 KG/M2 | HEART RATE: 74 BPM | SYSTOLIC BLOOD PRESSURE: 122 MMHG

## 2023-11-16 PROCEDURE — 99213 OFFICE O/P EST LOW 20 MIN: CPT

## 2024-01-29 ENCOUNTER — APPOINTMENT (OUTPATIENT)
Dept: RADIATION ONCOLOGY | Facility: CLINIC | Age: 79
End: 2024-01-29
Payer: MEDICARE

## 2024-01-29 VITALS
WEIGHT: 191 LBS | BODY MASS INDEX: 25.31 KG/M2 | DIASTOLIC BLOOD PRESSURE: 70 MMHG | HEIGHT: 73 IN | SYSTOLIC BLOOD PRESSURE: 124 MMHG

## 2024-01-29 DIAGNOSIS — C79.51 MALIGNANT NEOPLASM OF PROSTATE: ICD-10-CM

## 2024-01-29 DIAGNOSIS — C61 MALIGNANT NEOPLASM OF PROSTATE: ICD-10-CM

## 2024-01-29 PROCEDURE — 99215 OFFICE O/P EST HI 40 MIN: CPT

## 2024-01-29 NOTE — HISTORY OF PRESENT ILLNESS
[FreeTextEntry1] : This is a 78 year old male diagnosed with prostate cancer in October 2021 referred by Dr. Lozano.   He first presented to Dr. Carr in March 2021 with an elevated PSA of 6.29 ng/mL. Urinary frequency and weak stream noted at this time. Repeat PSA performed on 4/8/21 was 7.92 ng/mL. Biopsy was recommended at this time, however patient requested to have an MRI performed.   Prostate MRI performed on 5/31/21 showed a 50 cc prostate. Lesion noted midline, posterior medial, midgland to apex, peripheral zone, measuring 29 x 16 x 19 mm. Tumor bulges, deforms, or obscures capsule- extracapsular extension.  Right-sided neurovascular bundle involvement. No seminal vesicle invasion.   Patient chose active surveillance and to repeat PSA in 4 months.   PSA on 10/19/21 was 18.00 ng/mL. He chose to review his options at this time. He then saw Dr. Johnson and was told on examination he had a "rock hard" prostate.   PSA on 12/6/21 was 21.34 ng/mL.  PSA on 2/25/22 was 26.4.  He had a prostate biopsy on 2/10/22 with Dr. Johnson. Pathology showed adenocarcinoma in 12 out of 12 cores. [place] score of 4+4=8 in 8 cores and 4+5=9 in 4 cores. Maximum core involvement 90%.   CT A/P performed 3/4/22 showed no evidence of metastatic disease. However there is an indeterminate small left renal mass and right adrenal nodule versus nodular thickening. Bone scan performed 3/8/22 was negative for metastatic disease.   He received his first Firmagon injection in 5/22 with Dr. Lozano. Urinary frequency and nocturia noted (every 3 hours). Urinary retention noted at times.   He was assessed to have prostate adenoca, stage IIC, T3N0M0, grade group 5, Shant[place] score 9, 12 of 12 cores positive, PSA of 26.4. Patient has high volume, very high risk disease. He completed RT total dose of 7000 cGy in 26 fx to the prostate/SV/Nodes on 6/8/22.   PSA on 7/13/22 was 6.52.  PSA on 11/10/22 was 1.99. PSA on 4/18/23 was 0.509 by Dr. Lozano. PSA on 5/16/23 was 0.419. PSA on 6/13/23 was 0.42. PSA on 7/11/23 was 0.50. PSA on 8/8/23 was 0.68. PSA on 9/5/23 was 0.87. PSA on 10/3/23 was 1.43. PSA on 10/31/23 was 1.69. PSA on 1/24/24 was 7.65.   Nocturia 3-4x. Refusing Flomax. Denies hematuria or dysuria. Chronic constipation for which he takes laxatives. Saw GI and was recommended to have a colonoscopy, underwent Cologuard which was negative. States he is taking herbal laxatives. His next Firmagon injection is at the end of the month. First injection given 5/16/22 with Dr. Lozano. Patient states he was taking pomegranate extract for heart health, but is upset to learn that it may increase testosterone.  He underwent PSMA PET scan due to increasing PSA on 1/11/24 showed focal intense uptake at T12 demonstrating moderate PSMA expression compatible with osseous metastatic lesion. Nonspecific minimally avid focus along the right anterior third rib compatible to blood pool this time.   He continues on Firmagon injections with Dr. Lozano. Denies pain. Feeling well. He presents to discuss the role of radiation therapy in his care.

## 2024-01-29 NOTE — DISEASE MANAGEMENT
[Clinical] : TNM Stage: c [FreeTextEntry4] : prostate adenoca [TTNM] : 3 [NTNM] : 0 [MTNM] : 1 [IV] : IV

## 2024-02-02 ENCOUNTER — OUTPATIENT (OUTPATIENT)
Dept: OUTPATIENT SERVICES | Facility: HOSPITAL | Age: 79
LOS: 1 days | End: 2024-02-02
Payer: MEDICARE

## 2024-02-02 ENCOUNTER — APPOINTMENT (OUTPATIENT)
Dept: MRI IMAGING | Facility: CLINIC | Age: 79
End: 2024-02-02
Payer: MEDICARE

## 2024-02-02 DIAGNOSIS — C61 MALIGNANT NEOPLASM OF PROSTATE: ICD-10-CM

## 2024-02-02 PROCEDURE — 72157 MRI CHEST SPINE W/O & W/DYE: CPT

## 2024-02-02 PROCEDURE — A9585: CPT

## 2024-02-02 PROCEDURE — 72157 MRI CHEST SPINE W/O & W/DYE: CPT | Mod: 26,MH

## 2024-02-05 PROCEDURE — 77263 THER RADIOLOGY TX PLNG CPLX: CPT

## 2024-02-05 PROCEDURE — 77333 RADIATION TREATMENT AID(S): CPT

## 2024-02-05 PROCEDURE — 77290 THER RAD SIMULAJ FIELD CPLX: CPT

## 2024-02-12 PROCEDURE — 77295 3-D RADIOTHERAPY PLAN: CPT

## 2024-02-12 PROCEDURE — 77334 RADIATION TREATMENT AID(S): CPT

## 2024-02-12 PROCEDURE — 77300 RADIATION THERAPY DOSE PLAN: CPT

## 2024-02-14 PROCEDURE — 77370 RADIATION PHYSICS CONSULT: CPT

## 2024-02-16 ENCOUNTER — NON-APPOINTMENT (OUTPATIENT)
Age: 79
End: 2024-02-16

## 2024-02-16 PROCEDURE — 77373 STRTCTC BDY RAD THER TX DLVR: CPT

## 2024-02-16 NOTE — VITALS
[Maximal Pain Intensity: 5/10] : 5/10 [90: Able to carry normal activity; minor signs or symptoms of disease.] : 90: Able to carry normal activity; minor signs or symptoms of disease.

## 2024-02-16 NOTE — HISTORY OF PRESENT ILLNESS
[FreeTextEntry1] : This is a 78 year old male diagnosed with prostate cancer in October 2021 referred by Dr. Lozano.   He first presented to Dr. Carr in March 2021 with an elevated PSA of 6.29 ng/mL. Urinary frequency and weak stream noted at this time. Repeat PSA performed on 4/8/21 was 7.92 ng/mL. Biopsy was recommended at this time, however patient requested to have an MRI performed.   Prostate MRI performed on 5/31/21 showed a 50 cc prostate. Lesion noted midline, posterior medial, midgland to apex, peripheral zone, measuring 29 x 16 x 19 mm. Tumor bulges, deforms, or obscures capsule- extracapsular extension.  Right-sided neurovascular bundle involvement. No seminal vesicle invasion.   Patient chose active surveillance and to repeat PSA in 4 months.   PSA on 10/19/21 was 18.00 ng/mL. He chose to review his options at this time. He then saw Dr. Johnson and was told on examination he had a "rock hard" prostate.   PSA on 12/6/21 was 21.34 ng/mL.  PSA on 2/25/22 was 26.4.  He had a prostate biopsy on 2/10/22 with Dr. Johnson. Pathology showed adenocarcinoma in 12 out of 12 cores. [place] score of 4+4=8 in 8 cores and 4+5=9 in 4 cores. Maximum core involvement 90%.   CT A/P performed 3/4/22 showed no evidence of metastatic disease. However there is an indeterminate small left renal mass and right adrenal nodule versus nodular thickening. Bone scan performed 3/8/22 was negative for metastatic disease.   He received his first Firmagon injection in 5/22 with Dr. Lozano. Urinary frequency and nocturia noted (every 3 hours). Urinary retention noted at times.   He was assessed to have prostate adenoca, stage IIC, T3N0M0, grade group 5, Shanthi score 9, 12 of 12 cores positive, PSA of 26.4. Patient has high volume, very high risk disease. He completed RT total dose of 7000 cGy in 26 fx to the prostate/SV/Nodes on 6/8/22.   PSA on 7/13/22 was 6.52.  PSA on 11/10/22 was 1.99. PSA on 4/18/23 was 0.509 by Dr. Lozano. PSA on 5/16/23 was 0.419. PSA on 6/13/23 was 0.42. PSA on 7/11/23 was 0.50. PSA on 8/8/23 was 0.68. PSA on 9/5/23 was 0.87. PSA on 10/3/23 was 1.43. PSA on 10/31/23 was 1.69. PSA on 1/24/24 was 7.65.   Nocturia 3-4x. Refusing Flomax. Denies hematuria or dysuria. Chronic constipation for which he takes laxatives. Saw GI and was recommended to have a colonoscopy, underwent Cologuard which was negative. States he is taking herbal laxatives. His next Firmagon injection is at the end of the month. First injection given 5/16/22 with Dr. Lozano. Patient states he was taking pomegranate extract for heart health, but is upset to learn that it may increase testosterone.  He underwent PSMA PET scan due to increasing PSA on 1/11/24 showed focal intense uptake at T12 demonstrating moderate PSMA expression compatible with osseous metastatic lesion. Nonspecific minimally avid focus along the right anterior third rib compatible to blood pool this time.   He continues on Firmagon injections with Dr. Lozano. Denies pain. Feeling well.   He was assessed to have high risk prostate cancer, stage [id number] at diagnosis in 2022, received definitve RT to prostate/SV/nodes and adjuvant ADT. 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  He is currently undergoing SBRT to T12.  He presents for an OTV 1/3 fx. Feeling well other than left shoulder pain. States he hurt his shoulder recently.

## 2024-02-16 NOTE — DISEASE MANAGEMENT
[Clinical] : TNM Stage: c [IV] : IV [FreeTextEntry4] : prostate adenoca [TTNM] : 3 [NTNM] : 0 [MTNM] : 1 [de-identified] : 132 [de-identified] : 4216 [de-identified] : T12

## 2024-02-20 PROCEDURE — 77373 STRTCTC BDY RAD THER TX DLVR: CPT

## 2024-02-22 PROCEDURE — 77435 SBRT MANAGEMENT: CPT

## 2024-02-22 PROCEDURE — 77336 RADIATION PHYSICS CONSULT: CPT

## 2024-02-22 PROCEDURE — 77373 STRTCTC BDY RAD THER TX DLVR: CPT

## 2024-03-22 ENCOUNTER — APPOINTMENT (OUTPATIENT)
Dept: RADIATION ONCOLOGY | Facility: CLINIC | Age: 79
End: 2024-03-22
Payer: MEDICARE

## 2024-03-22 ENCOUNTER — NON-APPOINTMENT (OUTPATIENT)
Age: 79
End: 2024-03-22

## 2024-03-22 VITALS
WEIGHT: 189 LBS | RESPIRATION RATE: 18 BRPM | DIASTOLIC BLOOD PRESSURE: 74 MMHG | OXYGEN SATURATION: 97 % | BODY MASS INDEX: 24.94 KG/M2 | HEART RATE: 78 BPM | SYSTOLIC BLOOD PRESSURE: 136 MMHG

## 2024-03-22 PROCEDURE — 99212 OFFICE O/P EST SF 10 MIN: CPT

## 2024-03-22 NOTE — HISTORY OF PRESENT ILLNESS
[FreeTextEntry1] : This is a 79 year old male diagnosed with prostate cancer in October 2021 referred by Dr. Lozano.   He first presented to Dr. Carr in March 2021 with an elevated PSA of 6.29 ng/mL. Urinary frequency and weak stream noted at this time. Repeat PSA performed on 4/8/21 was 7.92 ng/mL. Biopsy was recommended at this time, however patient requested to have an MRI performed.   Prostate MRI performed on 5/31/21 showed a 50 cc prostate. Lesion noted midline, posterior medial, midgland to apex, peripheral zone, measuring 29 x 16 x 19 mm. Tumor bulges, deforms, or obscures capsule- extracapsular extension.  Right-sided neurovascular bundle involvement. No seminal vesicle invasion.   Patient chose active surveillance and to repeat PSA in 4 months.   PSA on 10/19/21 was 18.00 ng/mL. He chose to review his options at this time. He then saw Dr. Johnson and was told on examination he had a "rock hard" prostate.   PSA on 12/6/21 was 21.34 ng/mL.  PSA on 2/25/22 was 26.4.  He had a prostate biopsy on 2/10/22 with Dr. Johnson. Pathology showed adenocarcinoma in 12 out of 12 cores. Snyder score of 4+4=8 in 8 cores and 4+5=9 in 4 cores. Maximum core involvement 90%.   CT A/P performed 3/4/22 showed no evidence of metastatic disease. However there is an indeterminate small left renal mass and right adrenal nodule versus nodular thickening. Bone scan performed 3/8/22 was negative for metastatic disease.   He received his first Firmagon injection in 5/22 with Dr. Lozano. Urinary frequency and nocturia noted (every 3 hours). Urinary retention noted at times.   He was assessed to have prostate adenoca, stage IIC, T3N0M0, grade group 5, Shanthi score 9, 12 of 12 cores positive, PSA of 26.4. Patient has high volume, very high risk disease. He completed RT total dose of 7000 cGy in 26 fx to the prostate/SV/Nodes on 6/8/22.   PSA on 7/13/22 was 6.52.  PSA on 11/10/22 was 1.99. PSA on 4/18/23 was 0.509 by Dr. Lozano. PSA on 5/16/23 was 0.419. PSA on 6/13/23 was 0.42. PSA on 7/11/23 was 0.50. PSA on 8/8/23 was 0.68. PSA on 9/5/23 was 0.87. PSA on 10/3/23 was 1.43. PSA on 10/31/23 was 1.69. PSA on 1/24/24 was 7.65.   Nocturia 3-4x. Refusing Flomax. Denies hematuria or dysuria. Chronic constipation for which he takes laxatives. Saw GI and was recommended to have a colonoscopy, underwent Cologuard which was negative. States he is taking herbal laxatives. His next Firmagon injection is at the end of the month. First injection given 5/16/22 with Dr. Lozano. Patient states he was taking pomegranate extract for heart health, but is upset to learn that it may increase testosterone.  He underwent PSMA PET scan due to increasing PSA on 1/11/24 showed focal intense uptake at T12 demonstrating moderate PSMA expression compatible with osseous metastatic lesion. Nonspecific minimally avid focus along the right anterior third rib compatible to blood pool this time.   He continues on Firmagon injections with Dr. Lozano. Denies pain. Feeling well.   He was assessed to have high risk prostate cancer, stage [id number] at diagnosis in 2022, received definitve RT to prostate/SV/nodes and adjuvant ADT. 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  He completed SBRT to T12, total dose of 2400 cGy in 3 fx, on 2/22/24.   He presents for a one month PTE. Feeling well other than left shoulder pain. Denies any back pain.  Reports has osteoarthritis and the Methocarbonate relieves pain. Reports urine frequency every two hours. Denies dysuria,nocturia or weak stream.

## 2024-03-22 NOTE — VITALS
[Pain Description/Quality: ___] : Pain description/quality: [unfilled] [Pain Duration: ___] : Pain duration: [unfilled] [Pain Location: ___] : Pain Location: [unfilled] [Pain Interferes with ADLs] : Pain interferes with activities of daily living. [80: Normal activity with effort; some signs or symptoms of disease.] : 80: Normal activity with effort; some signs or symptoms of disease.  [Maximal Pain Intensity: 3/10] : 3/10

## 2024-03-28 ENCOUNTER — NON-APPOINTMENT (OUTPATIENT)
Age: 79
End: 2024-03-28

## 2024-04-29 ENCOUNTER — NON-APPOINTMENT (OUTPATIENT)
Age: 79
End: 2024-04-29

## 2024-05-19 ENCOUNTER — NON-APPOINTMENT (OUTPATIENT)
Age: 79
End: 2024-05-19

## 2024-05-20 ENCOUNTER — APPOINTMENT (OUTPATIENT)
Dept: RADIATION ONCOLOGY | Facility: CLINIC | Age: 79
End: 2024-05-20
Payer: MEDICARE

## 2024-05-20 VITALS
RESPIRATION RATE: 18 BRPM | WEIGHT: 191 LBS | HEIGHT: 73 IN | OXYGEN SATURATION: 96 % | BODY MASS INDEX: 25.31 KG/M2 | HEART RATE: 75 BPM | DIASTOLIC BLOOD PRESSURE: 70 MMHG | SYSTOLIC BLOOD PRESSURE: 118 MMHG

## 2024-05-20 PROCEDURE — 99213 OFFICE O/P EST LOW 20 MIN: CPT

## 2024-05-20 NOTE — HISTORY OF PRESENT ILLNESS
[FreeTextEntry1] : This is a 79 year old male diagnosed with prostate cancer in October 2021 referred by Dr. Lozano.   He first presented to Dr. Carr in March 2021 with an elevated PSA of 6.29 ng/mL. Urinary frequency and weak stream noted at this time. Repeat PSA performed on 4/8/21 was 7.92 ng/mL. Biopsy was recommended at this time, however patient requested to have an MRI performed.   Prostate MRI performed on 5/31/21 showed a 50 cc prostate. Lesion noted midline, posterior medial, midgland to apex, peripheral zone, measuring 29 x 16 x 19 mm. Tumor bulges, deforms, or obscures capsule- extracapsular extension.  Right-sided neurovascular bundle involvement. No seminal vesicle invasion.   Patient chose active surveillance and to repeat PSA in 4 months.   PSA on 10/19/21 was 18.00 ng/mL. He chose to review his options at this time. He then saw Dr. Johnson and was told on examination he had a "rock hard" prostate.   PSA on 12/6/21 was 21.34 ng/mL.  PSA on 2/25/22 was 26.4.  He had a prostate biopsy on 2/10/22 with Dr. Johnson. Pathology showed adenocarcinoma in 12 out of 12 cores. [place] score of 4+4=8 in 8 cores and 4+5=9 in 4 cores. Maximum core involvement 90%.   CT A/P performed 3/4/22 showed no evidence of metastatic disease. However there is an indeterminate small left renal mass and right adrenal nodule versus nodular thickening. Bone scan performed 3/8/22 was negative for metastatic disease.   He received his first Firmagon injection in 5/22 with Dr. Lozano. Urinary frequency and nocturia noted (every 3 hours). Urinary retention noted at times.   He was assessed to have prostate adenoca, stage IIC, T3N0M0, grade group 5, Shanthi score 9, 12 of 12 cores positive, PSA of 26.4. Patient has high volume, very high risk disease. He completed RT total dose of 7000 cGy in 26 fx to the prostate/SV/Nodes on 6/8/22.   PSA on 7/13/22 was 6.52.  PSA on 11/10/22 was 1.99. PSA on 4/18/23 was 0.509 by Dr. Lozano. PSA on 5/16/23 was 0.419. PSA on 6/13/23 was 0.42. PSA on 7/11/23 was 0.50. PSA on 8/8/23 was 0.68. PSA on 9/5/23 was 0.87. PSA on 10/3/23 was 1.43. PSA on 10/31/23 was 1.69. PSA on 1/24/24 was 7.65.   Nocturia 3-4x. Refusing Flomax. Denies hematuria or dysuria. Chronic constipation for which he takes laxatives. Saw GI and was recommended to have a colonoscopy, underwent Cologuard which was negative. States he is taking herbal laxatives. His next Firmagon injection is at the end of the month. First injection given 5/16/22 with Dr. Lozano. Patient states he was taking pomegranate extract for heart health, but is upset to learn that it may increase testosterone.  He underwent PSMA PET scan due to increasing PSA on 1/11/24 showed focal intense uptake at T12 demonstrating moderate PSMA expression compatible with osseous metastatic lesion. Nonspecific minimally avid focus along the right anterior third rib compatible to blood pool this time.   He continues on Firmagon injections with Dr. Lozano. Denies pain. Feeling well.   He was assessed to have high risk prostate cancer, stage [id number] at diagnosis in 2022, received definitve RT to prostate/SV/nodes and adjuvant ADT. 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  He completed SBRT to T12, total dose of 2400 cGy in 3 fx, on 2/22/24.   2/21/24- 8.760 3/27/24- 13.10 4/24/24- 7.84.  He presents for follow up. Denies any back pain. Reports has osteoarthritis and the Methocarbonate relieves pain. Reports urine frequency every two hours. Denies dysuria, nocturia or weak stream. Starting Zytiga soon with Dr. Lozano.

## 2024-12-07 ENCOUNTER — EMERGENCY (EMERGENCY)
Facility: HOSPITAL | Age: 79
LOS: 0 days | Discharge: HOME | End: 2024-12-07
Attending: EMERGENCY MEDICINE
Payer: MEDICARE

## 2024-12-07 VITALS
SYSTOLIC BLOOD PRESSURE: 148 MMHG | OXYGEN SATURATION: 100 % | RESPIRATION RATE: 18 BRPM | DIASTOLIC BLOOD PRESSURE: 77 MMHG | HEART RATE: 68 BPM

## 2024-12-07 VITALS
TEMPERATURE: 98 F | SYSTOLIC BLOOD PRESSURE: 139 MMHG | OXYGEN SATURATION: 100 % | RESPIRATION RATE: 18 BRPM | DIASTOLIC BLOOD PRESSURE: 88 MMHG | HEART RATE: 73 BPM

## 2024-12-07 DIAGNOSIS — R04.0 EPISTAXIS: ICD-10-CM

## 2024-12-07 DIAGNOSIS — Z86.718 PERSONAL HISTORY OF OTHER VENOUS THROMBOSIS AND EMBOLISM: ICD-10-CM

## 2024-12-07 DIAGNOSIS — Z85.46 PERSONAL HISTORY OF MALIGNANT NEOPLASM OF PROSTATE: ICD-10-CM

## 2024-12-07 DIAGNOSIS — Z79.01 LONG TERM (CURRENT) USE OF ANTICOAGULANTS: ICD-10-CM

## 2024-12-07 PROCEDURE — 30901 CONTROL OF NOSEBLEED: CPT | Mod: LT

## 2024-12-07 PROCEDURE — 99282 EMERGENCY DEPT VISIT SF MDM: CPT | Mod: 25

## 2024-12-07 PROCEDURE — 99283 EMERGENCY DEPT VISIT LOW MDM: CPT | Mod: 25

## 2024-12-07 NOTE — ED PROCEDURE NOTE - PROCEDURE ADDITIONAL DETAILS
Gauze soaked in 1% lidocaine with epinephrine placed in left naris for approximately 25 minutes.  Upon removal, there was no active bleeding or source that could be cauterized.

## 2024-12-07 NOTE — ED ADULT NURSE NOTE - NSFALLRISKINTERV_ED_ALL_ED

## 2024-12-07 NOTE — ED ADULT NURSE NOTE - OBJECTIVE STATEMENT
pt. is a 80 y/o male a&o x4 BIBEMS complaining of epistaxis to left nostril.  as per pt. bleeding began 20 min PTA. Pt packed L nare, called EMS who applied ice pack to nose en route. Pt states he is on blood thinner, but unsure of name. Packing remains in nare, no bleeding around packing at this time. Pt endorses allergies to meds, unable to name which meds. MD Talbert at bedside, nostril redressed.

## 2024-12-07 NOTE — ED PROVIDER NOTE - CLINICAL SUMMARY MEDICAL DECISION MAKING FREE TEXT BOX
See procedure note   epistaxis controlled after gauze soaked in 1% lidocaine with epinephrine laced in left naris for approximately 25 minutes.  After removal, there was no active bleeding.  Patient given return precautions and advised to follow-up with ENT.

## 2024-12-07 NOTE — ED PROVIDER NOTE - CARE PROVIDER_API CALL
Darryn Joyce  Otolaryngology  [address] [place] [id number]  Phone: [phone]  Fax: [phone]  Follow Up Time: 4-6 Days

## 2024-12-07 NOTE — ED PROVIDER NOTE - PATIENT PORTAL LINK FT
You can access the FollowMyHealth Patient Portal offered by [facility] by registering at the following website: [email or web address]. By joining [facility]’s FollowMyHealth portal, you will also be able to view your health information using other applications (apps) compatible with our system.

## 2024-12-07 NOTE — ED PROVIDER NOTE - OBJECTIVE STATEMENT
79-year-old male with history of prostate cancer, DVT on Eliquis presents for spontaneous epistaxis of the left naris noted just prior to arrival.  Patient states that this occurred when he was sitting on the toilet to have a bowel movement.  Patient denies any recent trauma.  Placed gauze of his left naris to control the bleeding.  Patient denies any shortness of breath.  Patient denies any significant blood in the posterior oropharynx.

## 2024-12-07 NOTE — ED ADULT TRIAGE NOTE - CHIEF COMPLAINT QUOTE
Pt BIBEMS from home c/o epistaxis x20 mins PTA. Pt packed L nare, called EMS who applied ice pack to nose en route. Pt states he is on bloodthinner, but unsure of name. Packing remains in nare, no bleeding around packing at this time. Pt endorses allergies to meds, unable to name which meds.

## 2024-12-07 NOTE — ED PROVIDER NOTE - NSICDXPASTMEDICALHX_GEN_ALL_CORE_FT
PAST MEDICAL HISTORY:  DVT, lower extremity     No pertinent past medical history     Prostate cancer

## 2024-12-09 RX ORDER — PREDNISONE 20 MG/1
1 TABLET ORAL
Refills: 0 | DISCHARGE
Start: 2024-12-09

## 2024-12-12 ENCOUNTER — INPATIENT (INPATIENT)
Facility: HOSPITAL | Age: 79
LOS: 4 days | Discharge: NURSING, REHAB OR LONG-TERM CARE | DRG: 947 | End: 2024-12-17
Attending: STUDENT IN AN ORGANIZED HEALTH CARE EDUCATION/TRAINING PROGRAM | Admitting: STUDENT IN AN ORGANIZED HEALTH CARE EDUCATION/TRAINING PROGRAM
Payer: MEDICARE

## 2024-12-12 VITALS
DIASTOLIC BLOOD PRESSURE: 71 MMHG | WEIGHT: 173.06 LBS | RESPIRATION RATE: 18 BRPM | OXYGEN SATURATION: 95 % | HEART RATE: 92 BPM | SYSTOLIC BLOOD PRESSURE: 139 MMHG | TEMPERATURE: 98 F

## 2024-12-12 PROCEDURE — 99285 EMERGENCY DEPT VISIT HI MDM: CPT

## 2024-12-12 NOTE — ED ADULT TRIAGE NOTE - CHIEF COMPLAINT QUOTE
Pt BIBEMS from home with c/o AMS since unknown time. Per EMS, pt was repeatedly calling friend and not making sense stating he's been on the toilet since last night. EMS reports police showed up to house for welfare check and found pt sitting on toilet. Skin tear noted to palm of left hand from "holding onto rails". Pt endorsing right foot pain. B/L feet appear edematous and pale with b/l mottled lower extremities. Pt unable to lift up b/l LE. BEFAST NEGATIVE. MD Jimenez called for eval, no code called.  in triage, A&Ox3, endorses allergies but pt and EMS unsure to which medications. hx of cancer and parkinson's.

## 2024-12-13 DIAGNOSIS — R41.82 ALTERED MENTAL STATUS, UNSPECIFIED: ICD-10-CM

## 2024-12-13 PROBLEM — I82.409 ACUTE EMBOLISM AND THROMBOSIS OF UNSPECIFIED DEEP VEINS OF UNSPECIFIED LOWER EXTREMITY: Chronic | Status: ACTIVE | Noted: 2024-12-07

## 2024-12-13 PROBLEM — C61 MALIGNANT NEOPLASM OF PROSTATE: Chronic | Status: ACTIVE | Noted: 2024-12-07

## 2024-12-13 LAB
ALBUMIN SERPL ELPH-MCNC: 3.5 G/DL — SIGNIFICANT CHANGE UP (ref 3.3–5)
ALP SERPL-CCNC: 62 U/L — SIGNIFICANT CHANGE UP (ref 40–120)
ALT FLD-CCNC: 37 U/L — SIGNIFICANT CHANGE UP (ref 12–78)
ANION GAP SERPL CALC-SCNC: 6 MMOL/L — SIGNIFICANT CHANGE UP (ref 5–17)
APPEARANCE UR: ABNORMAL
APTT BLD: 29.1 SEC — SIGNIFICANT CHANGE UP (ref 24.5–35.6)
AST SERPL-CCNC: 128 U/L — HIGH (ref 15–37)
BACTERIA # UR AUTO: NEGATIVE /HPF — SIGNIFICANT CHANGE UP
BASOPHILS # BLD AUTO: 0.02 K/UL — SIGNIFICANT CHANGE UP (ref 0–0.2)
BASOPHILS NFR BLD AUTO: 0.2 % — SIGNIFICANT CHANGE UP (ref 0–2)
BILIRUB SERPL-MCNC: 1.3 MG/DL — HIGH (ref 0.2–1.2)
BILIRUB UR-MCNC: NEGATIVE — SIGNIFICANT CHANGE UP
BUN SERPL-MCNC: 17 MG/DL — SIGNIFICANT CHANGE UP (ref 7–23)
CALCIUM SERPL-MCNC: 9.2 MG/DL — SIGNIFICANT CHANGE UP (ref 8.5–10.1)
CAST: 1 /LPF — SIGNIFICANT CHANGE UP (ref 0–4)
CHLORIDE SERPL-SCNC: 108 MMOL/L — SIGNIFICANT CHANGE UP (ref 96–108)
CO2 SERPL-SCNC: 26 MMOL/L — SIGNIFICANT CHANGE UP (ref 22–31)
COLOR SPEC: YELLOW — SIGNIFICANT CHANGE UP
CREAT SERPL-MCNC: 0.69 MG/DL — SIGNIFICANT CHANGE UP (ref 0.5–1.3)
DIFF PNL FLD: ABNORMAL
EGFR: 94 ML/MIN/1.73M2 — SIGNIFICANT CHANGE UP
EOSINOPHIL # BLD AUTO: 0.01 K/UL — SIGNIFICANT CHANGE UP (ref 0–0.5)
EOSINOPHIL NFR BLD AUTO: 0.1 % — SIGNIFICANT CHANGE UP (ref 0–6)
FLUAV AG NPH QL: SIGNIFICANT CHANGE UP
FLUBV AG NPH QL: SIGNIFICANT CHANGE UP
GLUCOSE SERPL-MCNC: 107 MG/DL — HIGH (ref 70–99)
GLUCOSE UR QL: NEGATIVE MG/DL — SIGNIFICANT CHANGE UP
HCT VFR BLD CALC: 37.4 % — LOW (ref 39–50)
HGB BLD-MCNC: 12.2 G/DL — LOW (ref 13–17)
IMM GRANULOCYTES NFR BLD AUTO: 0.6 % — SIGNIFICANT CHANGE UP (ref 0–0.9)
INR BLD: 1.24 RATIO — HIGH (ref 0.85–1.16)
KETONES UR-MCNC: 40 MG/DL
LACTATE SERPL-SCNC: 1.7 MMOL/L — SIGNIFICANT CHANGE UP (ref 0.7–2)
LEUKOCYTE ESTERASE UR-ACNC: NEGATIVE — SIGNIFICANT CHANGE UP
LYMPHOCYTES # BLD AUTO: 0.18 K/UL — LOW (ref 1–3.3)
LYMPHOCYTES # BLD AUTO: 1.9 % — LOW (ref 13–44)
MCHC RBC-ENTMCNC: 30.8 PG — SIGNIFICANT CHANGE UP (ref 27–34)
MCHC RBC-ENTMCNC: 32.6 G/DL — SIGNIFICANT CHANGE UP (ref 32–36)
MCV RBC AUTO: 94.4 FL — SIGNIFICANT CHANGE UP (ref 80–100)
MONOCYTES # BLD AUTO: 0.72 K/UL — SIGNIFICANT CHANGE UP (ref 0–0.9)
MONOCYTES NFR BLD AUTO: 7.5 % — SIGNIFICANT CHANGE UP (ref 2–14)
NEUTROPHILS # BLD AUTO: 8.63 K/UL — HIGH (ref 1.8–7.4)
NEUTROPHILS NFR BLD AUTO: 89.7 % — HIGH (ref 43–77)
NITRITE UR-MCNC: NEGATIVE — SIGNIFICANT CHANGE UP
PH UR: 5.5 — SIGNIFICANT CHANGE UP (ref 5–8)
PLATELET # BLD AUTO: 116 K/UL — LOW (ref 150–400)
POTASSIUM SERPL-MCNC: 3 MMOL/L — LOW (ref 3.5–5.3)
POTASSIUM SERPL-SCNC: 3 MMOL/L — LOW (ref 3.5–5.3)
PROT SERPL-MCNC: 6.8 GM/DL — SIGNIFICANT CHANGE UP (ref 6–8.3)
PROT UR-MCNC: 30 MG/DL
PROTHROM AB SERPL-ACNC: 14.6 SEC — HIGH (ref 9.9–13.4)
RBC # BLD: 3.96 M/UL — LOW (ref 4.2–5.8)
RBC # FLD: 12.8 % — SIGNIFICANT CHANGE UP (ref 10.3–14.5)
RBC CASTS # UR COMP ASSIST: 51 /HPF — HIGH (ref 0–4)
RSV RNA NPH QL NAA+NON-PROBE: SIGNIFICANT CHANGE UP
SARS-COV-2 RNA SPEC QL NAA+PROBE: SIGNIFICANT CHANGE UP
SODIUM SERPL-SCNC: 140 MMOL/L — SIGNIFICANT CHANGE UP (ref 135–145)
SP GR SPEC: 1.01 — SIGNIFICANT CHANGE UP (ref 1–1.03)
SQUAMOUS # UR AUTO: 0 /HPF — SIGNIFICANT CHANGE UP (ref 0–5)
UROBILINOGEN FLD QL: 0.2 MG/DL — SIGNIFICANT CHANGE UP (ref 0.2–1)
WBC # BLD: 9.62 K/UL — SIGNIFICANT CHANGE UP (ref 3.8–10.5)
WBC # FLD AUTO: 9.62 K/UL — SIGNIFICANT CHANGE UP (ref 3.8–10.5)
WBC UR QL: 0 /HPF — SIGNIFICANT CHANGE UP (ref 0–5)

## 2024-12-13 PROCEDURE — 99222 1ST HOSP IP/OBS MODERATE 55: CPT

## 2024-12-13 PROCEDURE — 97116 GAIT TRAINING THERAPY: CPT | Mod: GP

## 2024-12-13 PROCEDURE — 80053 COMPREHEN METABOLIC PANEL: CPT

## 2024-12-13 PROCEDURE — 85027 COMPLETE CBC AUTOMATED: CPT

## 2024-12-13 PROCEDURE — 70450 CT HEAD/BRAIN W/O DYE: CPT | Mod: 26,MC

## 2024-12-13 PROCEDURE — 83540 ASSAY OF IRON: CPT

## 2024-12-13 PROCEDURE — 36415 COLL VENOUS BLD VENIPUNCTURE: CPT

## 2024-12-13 PROCEDURE — 97110 THERAPEUTIC EXERCISES: CPT | Mod: GP

## 2024-12-13 PROCEDURE — 71260 CT THORAX DX C+: CPT | Mod: 26,MC

## 2024-12-13 PROCEDURE — 71045 X-RAY EXAM CHEST 1 VIEW: CPT | Mod: 26

## 2024-12-13 PROCEDURE — 85025 COMPLETE CBC W/AUTO DIFF WBC: CPT

## 2024-12-13 PROCEDURE — 74177 CT ABD & PELVIS W/CONTRAST: CPT | Mod: 26,MC

## 2024-12-13 PROCEDURE — 80048 BASIC METABOLIC PNL TOTAL CA: CPT

## 2024-12-13 PROCEDURE — 97166 OT EVAL MOD COMPLEX 45 MIN: CPT | Mod: GO

## 2024-12-13 RX ORDER — APIXABAN 2.5 MG/1
1 TABLET, FILM COATED ORAL
Refills: 0 | DISCHARGE

## 2024-12-13 RX ORDER — PREDNISONE 20 MG/1
5 TABLET ORAL
Refills: 0 | Status: DISCONTINUED | OUTPATIENT
Start: 2024-12-13 | End: 2024-12-17

## 2024-12-13 RX ORDER — APIXABAN 2.5 MG/1
5 TABLET, FILM COATED ORAL EVERY 12 HOURS
Refills: 0 | Status: DISCONTINUED | OUTPATIENT
Start: 2024-12-13 | End: 2024-12-17

## 2024-12-13 RX ORDER — ONDANSETRON HYDROCHLORIDE 4 MG/1
4 TABLET, FILM COATED ORAL EVERY 8 HOURS
Refills: 0 | Status: DISCONTINUED | OUTPATIENT
Start: 2024-12-13 | End: 2024-12-17

## 2024-12-13 RX ORDER — POTASSIUM CHLORIDE 600 MG/1
40 TABLET, EXTENDED RELEASE ORAL ONCE
Refills: 0 | Status: COMPLETED | OUTPATIENT
Start: 2024-12-13 | End: 2024-12-13

## 2024-12-13 RX ORDER — MAGNESIUM, ALUMINUM HYDROXIDE 200-225/5
30 SUSPENSION, ORAL (FINAL DOSE FORM) ORAL EVERY 4 HOURS
Refills: 0 | Status: DISCONTINUED | OUTPATIENT
Start: 2024-12-13 | End: 2024-12-17

## 2024-12-13 RX ORDER — ACETAMINOPHEN, DIPHENHYDRAMINE HCL, PHENYLEPHRINE HCL 325; 25; 5 MG/1; MG/1; MG/1
3 TABLET ORAL AT BEDTIME
Refills: 0 | Status: DISCONTINUED | OUTPATIENT
Start: 2024-12-13 | End: 2024-12-17

## 2024-12-13 RX ORDER — POTASSIUM CHLORIDE 600 MG/1
40 TABLET, EXTENDED RELEASE ORAL EVERY 4 HOURS
Refills: 0 | Status: COMPLETED | OUTPATIENT
Start: 2024-12-13 | End: 2024-12-13

## 2024-12-13 RX ORDER — ACETAMINOPHEN 500MG 500 MG/1
650 TABLET, COATED ORAL EVERY 6 HOURS
Refills: 0 | Status: DISCONTINUED | OUTPATIENT
Start: 2024-12-13 | End: 2024-12-17

## 2024-12-13 RX ORDER — INFLUENZA VIRUS VACCINE 15; 15; 15; 15 UG/.5ML; UG/.5ML; UG/.5ML; UG/.5ML
0.5 SUSPENSION INTRAMUSCULAR ONCE
Refills: 0 | Status: COMPLETED | OUTPATIENT
Start: 2024-12-13 | End: 2024-12-13

## 2024-12-13 RX ADMIN — POTASSIUM CHLORIDE 40 MILLIEQUIVALENT(S): 600 TABLET, EXTENDED RELEASE ORAL at 15:50

## 2024-12-13 RX ADMIN — POTASSIUM CHLORIDE 40 MILLIEQUIVALENT(S): 600 TABLET, EXTENDED RELEASE ORAL at 06:10

## 2024-12-13 NOTE — PHYSICAL THERAPY INITIAL EVALUATION ADULT - IMPAIRMENTS FOUND, PT EVAL
anthropometric characteristics/circulation/gait, locomotion, and balance/integumentary integrity/muscle strength/posture

## 2024-12-13 NOTE — PHYSICAL THERAPY INITIAL EVALUATION ADULT - IMPAIRMENTS CONTRIBUTING TO GAIT DEVIATIONS, PT EVAL
posture moderately stooped fwd /hunched ,slow deliberate progression, mild difficulty advancing R >L feet/impaired postural control

## 2024-12-13 NOTE — ED ADULT NURSE REASSESSMENT NOTE - NS ED NURSE REASSESS COMMENT FT1
plan of care assumed from RAJESH Pride. vital signs obtained and documented. pt. endorses no complaints at this time. pt. medicated with PO Potassium, able to tolerate swallowing well.

## 2024-12-13 NOTE — ED ADULT NURSE NOTE - OBJECTIVE STATEMENT
Pt presents to ED from home with c/o AMS. Pt states he felt weak and was unable to get up off the toilet today.     Pt BIBEMS from home with c/o AMS since unknown time. Per EMS, pt was repeatedly calling friend and not making sense stating he's been on the toilet since last night. EMS reports police showed up to house for welfare check and found pt sitting on toilet. Skin tear noted to palm of left hand from "holding onto rails". Pt endorsing right foot pain. B/L feet appear edematous and pale with b/l mottled lower extremities. Pt unable to lift up b/l LE. BEFAST NEGATIVE. MD Jimenez called for eval, no code called.  in triage, A&Ox3, endorses allergies but pt and EMS unsure to which medications. hx of cancer and parkinson's. Pt presents to ED from home with c/o AMS. Pt states he felt weak and was unable to get up off the toilet today. Pt has skin tear on L hand from holding onto the rails. Pt states BL feet and legs are hurting and causing the weakness.

## 2024-12-13 NOTE — PHYSICAL THERAPY INITIAL EVALUATION ADULT - SKIN TURGOR
IV site L dorsal distal FA dripping fresh red blood when L arm dependent , RN made aware and I bandaged the IV site ,pt very upset convinced the dripping blood is coming from his "butt" and wanted to stick his finger in his anus to check for blood from which I strongly discouraged hime/slow return to original state

## 2024-12-13 NOTE — PHYSICAL THERAPY INITIAL EVALUATION ADULT - LEVEL OF INDEPENDENCE: STAND/SIT, REHAB EVAL
Pt seated in high back chair after amb 15-20ft and was returned to the bedside in the chair ; completes chair to bed transfer with modA2/minimum assist (75% patients effort)/moderate assist (50% patients effort)

## 2024-12-13 NOTE — PHYSICAL THERAPY INITIAL EVALUATION ADULT - MODALITIES TREATMENT COMMENTS
pt attributes involuntary movements of head, tremors B hands to side-effect of Zytiga ( a medication prescribed for treatment of prostate CA )

## 2024-12-13 NOTE — ED PROVIDER NOTE - PROGRESS NOTE DETAILS
All labs personally reviewed, mild hypokalemia already repleted, labs otherwise nonactionable.  Urine with blood but no signs of infection.  Flu/COVID-negative.  CTs without any acute traumatic findings.  Does have incidental findings of a sclerotic lesion in pancreatic neck hypodensity.  Due to altered mental status, will admit to medicine for further management.  I, Sampson Dave, personally discussed this patient's care with Dr. Hamm who recommends admit to [facility]. DO Tenzin: Patient signed out to me by Dr. Jimenez. Patient is here with altered mental status. Plan of care is to follow-up CT. Disposition is admit.

## 2024-12-13 NOTE — ED PROVIDER NOTE - OBJECTIVE STATEMENT
79-year-old male with history of prostate cancer, DVT on Eliquis presents with concern for confusion.  Patient's friend was unable to reach patient today, police broke down the door and patient was found to be sitting on the toilet.  Patient was unsure how long he was in the bathroom.  Denies fall.  Patient without acute complaints.Denies fevers chills recent illness.

## 2024-12-13 NOTE — PATIENT PROFILE ADULT - FALL HARM RISK - ATTEMPT OOB
2020  1:42 PM    Patient contacted regarding recent discharge and COVID-19 risk. Discussed COVID-19 related testing which was not done at this time. Test results were not done. Patient informed of results, if available? N/A      Care Transition Nurse/ Ambulatory Care Manager/ LPN Care Coordinator contacted the patient by telephone to perform post discharge assessment. Verified name and  with patient as identifiers. Patient has following risk factors of: asthma. CTN/ACM/LPN reviewed discharge instructions, medical action plan and red flags related to discharge diagnosis. Reviewed and educated them on any new and changed medications related to discharge diagnosis; Rx - albuterol nebulizer, prednisone, tussi-organidin dm, atarax. Patient reports that he was able to pick-up albuterol nebulizer solution post-discharge; patient has not yet picked up prednisone or atarax. ACM advised patient that both prednisone and atarax were previously sent to [facility]; pt verbalizes understanding. Patient reports advised by pharmacy that tussi-organidin was OTC. Patient requests to complete remainder of initial post-discharge assessment at a later time. No

## 2024-12-13 NOTE — H&P ADULT - ASSESSMENT
80 yo M pmhx prostate cancer undergoing treatment, DVT on eliquis, hypotension BIBEMS for AMS after wellness check.     #AMS? (resolved)   Pt found during wellness check now at baseline mental status AOX3 though sometimes slow to respond and poor historian unable to remember all his medications. Lives alone.   - CTH unremarkable   - PT/OT eval   - social work consult     #Hx of DVT   - cw home eliquis     #Hx of Pancreatic cancer   Pt with hx of pancreatic cancer currently undergoing treatment able to state the names of some cancer medications but unable to remember his oncologist.   - heme onc consulted, appreciate recs     #Anemia   Baseline hgb unknown likely 2/2 to chronic illness (pancreatic cancer)   - trend H&H daily  - f/u iron panel     #Hypokalemia   - replete     #Code: Pt AOx3 would like to be DNR/DNI trial of NIV MOLST form filled out   #HCP: friend Regis Fraire     78 yo M pmhx prostate cancer undergoing treatment, DVT on eliquis, hypotension BIBEMS for AMS after wellness check.     #AMS? (resolved)   Pt found during wellness check now at baseline mental status AOX3 though sometimes slow to respond and poor historian unable to remember all his medications. Lives alone.   - CTH unremarkable   - PT/OT eval   - social work consult     #Hx of DVT   - cw home eliquis     #Hx of Pancreatic cancer   Pt with hx of pancreatic cancer currently undergoing treatment able to state the names of some cancer medications but unable to remember his oncologist.   - heme onc consulted, appreciate recs   - cw home prednisone unclear indication     #Anemia   Baseline hgb unknown likely 2/2 to chronic illness (pancreatic cancer)   - trend H&H daily  - f/u iron panel     #Hypokalemia   - replete     #Code: Pt AOx3 would like to be DNR/DNI trial of NIV MOLST form filled out   #HCP: friend Regis Fraire

## 2024-12-13 NOTE — ED ADULT NURSE REASSESSMENT NOTE - NS ED NURSE REASSESS COMMENT FT1
Pt is breathing unlabored and spontaneously, respirations even. Pt does not appear in discomfort or distress at this time. Endorsing no complaints at this time. Pt updated on plan of care. Safety and comfort maintained.

## 2024-12-13 NOTE — H&P ADULT - HISTORY OF PRESENT ILLNESS
HPI:80 yo M pmhx prostate cancer undergoing treatment, DVT on eliquis, hypotension BIBEMS after wellness check. Pt lives a lone has no next of kin neighbor called wellness check on patient because he had not seen him for a few days. Pt was found conscious sitting on the toilet but confused unsure of how long he had been in the bathroom. He denied any head strike, LOC. Pt with no acute complaints. CTH unremarkable and CT CAP notable for T12 lesion and minimal non specific pancreatic hypodensity. Pt evaluated bedside now AOX3 able to tell me name, date and where he is.         PAST MEDICAL & SURGICAL HISTORY:  Prostate cancer  DVT, lower extremity    FAMILY HISTORY:not pertinent     Social History:  lives alone no NOK     Allergies    Allergy Status Unknown    Intolerances        MEDICATIONS  (STANDING):  potassium chloride    Tablet ER 40 milliEquivalent(s) Oral every 4 hours    MEDICATIONS  (PRN):  acetaminophen     Tablet .. 650 milliGRAM(s) Oral every 6 hours PRN Temp greater or equal to 38C (100.4F), Mild Pain (1 - 3)  aluminum hydroxide/magnesium hydroxide/simethicone Suspension 30 milliLiter(s) Oral every 4 hours PRN Dyspepsia  melatonin 3 milliGRAM(s) Oral at bedtime PRN Insomnia  ondansetron Injectable 4 milliGRAM(s) IV Push every 8 hours PRN Nausea and/or Vomiting      ROS:  General:  No fevers, chills, or unexplained weight loss  Skin: No rash or bothersome skin lesions  Musculoskeletal: No arthalgias, myalgias or joint swelling  Eyes: No visual changes or eye pain  Ears: No hearing loss , otorrhea or ear pain  Nose, Mouth, Throat: No nasal congestion, rhinorrhea, oral lesions, postnasal drip or sore throat  Cardio: No chest pain or palpitations. no lower extremity edema. no syncope. no claudication.   Respiratory: No cough, shortness of breath or wheezing   GI: No diarrhea, constipation, blood in stools, abdominal pain, vomiting or heartburn  : No urinary frequency, hematuria, incontinence, or dysuria  Neurologic: No headaches, parasthesias, confusion, dysarthria or gait instability  Psychiatric:  No anxiety or depression  Lymphatic:  No easy bruising, easy bleeding or swollen glands  Allergic: No itching, sneezing , watery eyes, clear rhinorrhea or recurrent infections    PEx  T(C): 36.7 (12-13-24 @ 09:49), Max: 36.8 (24 @ 23:50)  HR: 76 (24 @ 09:49) (76 - 92)  BP: 134/70 (24 @ 09:49) (126/68 - 139/71)  RR: 18 (24 @ 09:49) (15 - 18)  SpO2: 99% (24 @ 09:49) (95% - 100%)  Wt(kg): --  General:     no acute distress, no identifying marks , scars, or tattoos.  Skin: no rash or prominent lesions  Head: normocephalic, atraumatic     Sinuses: non-tender  Nose: no external lesions, mucosa non-inflamed, septum and turbinates normal  Throat: no erythema, exudates or lesions.  Neck: Supple without lymphadenopathy. Thyroid no thyromegaly, no palpable thyroid nodules, no palpable nodules or masses, carotid arteries no bruits.   Heart: RRR, no murmur or gallop.  Normal S1, S2.  No S3, S4.   Lungs: CTA bilaterally, no wheezes, rhonchi, rales.  Breathing unlabored.   Chest wall: Normal insp   Abdomen:  Soft, NT/ND, normal bowel sounds, no HSM, no masses.  No peritoneal signs.   Back: spine normal without deformity or tenderness.  Normal ROM   Extremities: No deformities, clubbing, cyanosis, or edema.  Musculoskeletal: Normal gait and station. No decreased range of motion, instability, atrophy or abnormal strength or tone in the head, neck, spine, ribs, pelvis or extremities.   Neurologic: CN 2-12 normal. Sensation to pain, touch and proprioception normal. DTRs normal in upper and lower extremities. No pathologic reflexes.  Motor normal.  Psychiatric: Oriented X3, intact recent and remote memory, judgement and insight, normal mood and affect.                          12.2   9.62  )-----------( 116      ( 13 Dec 2024 00:36 )             37.4         140  |  108  |  17  ----------------------------<  107[H]  3.0[L]   |  26  |  0.69    Ca    9.2      13 Dec 2024 00:36    TPro  6.8  /  Alb  3.5  /  TBili  1.3[H]  /  DBili  x   /  AST  128[H]  /  ALT  37  /  AlkPhos  62  12-13    CAPILLARY BLOOD GLUCOSE      POCT Blood Glucose.: 101 mg/dL (12 Dec 2024 23:51)    PT/INR - ( 13 Dec 2024 01:46 )   PT: 14.6 sec;   INR: 1.24 ratio         PTT - ( 13 Dec 2024 01:46 )  PTT:29.1 sec  Urinalysis Basic - ( 13 Dec 2024 02:30 )    Color: Yellow / Appearance: Turbid / S.015 / pH: x  Gluc: x / Ketone: 40 mg/dL  / Bili: Negative / Urobili: 0.2 mg/dL   Blood: x / Protein: 30 mg/dL / Nitrite: Negative   Leuk Esterase: Negative / RBC: 51 /HPF / WBC 0 /HPF   Sq Epi: x / Non Sq Epi: 0 /HPF / Bacteria: Negative /HPF          Radiology/Imaging, I have personally reviewed:    CT CAP   IMPRESSION:  1.  No acute thoracoabdominal pathology.  2.  13 mm RIGHT T12 body sclerotic lesion (developed from 3/4/2022)   should be viewed with suspicion in patient with prostate cancer  3.  Minimal/ 7mm nonspecific pancreatic neck hypodensity ()and   adjacent 4 mm head cyst (). Correlation with comorbidities may   determine need for primary characterization with contrast MRI    EKG Sinus rhythmn with pvs prolonged qt qtc 504         HPI:78 yo M pmhx prostate cancer undergoing treatment, DVT on eliquis, hypotension BIBEMS after wellness check. Pt lives a lone has no next of kin neighbor called wellness check on patient because he had not seen him for a few days. Pt was found conscious sitting on the toilet but confused unsure of how long he had been in the bathroom. He denied any head strike, LOC. Pt with no acute complaints. CTH unremarkable and CT CAP notable for T12 lesion and minimal non specific pancreatic hypodensity. Pt evaluated bedside now AOX3 able to tell me name, date and where he is.         PAST MEDICAL & SURGICAL HISTORY:  Prostate cancer  DVT, lower extremity    FAMILY HISTORY:not pertinent     Social History:  lives alone no NOK     Allergies    Allergy Status Unknown    Intolerances        MEDICATIONS  (STANDING):  potassium chloride    Tablet ER 40 milliEquivalent(s) Oral every 4 hours    MEDICATIONS  (PRN):  acetaminophen     Tablet .. 650 milliGRAM(s) Oral every 6 hours PRN Temp greater or equal to 38C (100.4F), Mild Pain (1 - 3)  aluminum hydroxide/magnesium hydroxide/simethicone Suspension 30 milliLiter(s) Oral every 4 hours PRN Dyspepsia  melatonin 3 milliGRAM(s) Oral at bedtime PRN Insomnia  ondansetron Injectable 4 milliGRAM(s) IV Push every 8 hours PRN Nausea and/or Vomiting      ROS:  General:  No fevers, chills, or unexplained weight loss  Skin: No rash or bothersome skin lesions  Musculoskeletal: No arthalgias, myalgias or joint swelling  Eyes: No visual changes or eye pain  Ears: No hearing loss , otorrhea or ear pain  Nose, Mouth, Throat: No nasal congestion, rhinorrhea, oral lesions, postnasal drip or sore throat  Cardio: No chest pain or palpitations. no lower extremity edema. no syncope. no claudication.   Respiratory: No cough, shortness of breath or wheezing   GI: No diarrhea, constipation, blood in stools, abdominal pain, vomiting or heartburn  : No urinary frequency, hematuria, incontinence, or dysuria  Neurologic: No headaches, parasthesias, confusion, dysarthria or gait instability  Psychiatric:  No anxiety or depression  Lymphatic:  No easy bruising, easy bleeding or swollen glands  Allergic: No itching, sneezing , watery eyes, clear rhinorrhea or recurrent infections    PEx  T(C): 36.7 (12-13-24 @ 09:49), Max: 36.8 (24 @ 23:50)  HR: 76 (24 @ 09:49) (76 - 92)  BP: 134/70 (24 @ 09:49) (126/68 - 139/71)  RR: 18 (24 @ 09:49) (15 - 18)  SpO2: 99% (24 @ 09:49) (95% - 100%)  Wt(kg): --  General:     no acute distress, no identifying marks , scars, or tattoos.  Skin: chronic skin changes in bilateral lower extremity   Head: normocephalic, atraumatic     Sinuses: non-tender  Nose: no external lesions, mucosa non-inflamed, septum and turbinates normal  Throat: no erythema, exudates or lesions.  Neck: Supple without lymphadenopathy. Thyroid no thyromegaly, no palpable thyroid nodules, no palpable nodules or masses, carotid arteries no bruits.   Heart: RRR, no murmur or gallop.  Normal S1, S2.  No S3, S4.   Lungs: CTA bilaterally, no wheezes, rhonchi, rales.  Breathing unlabored.   Chest wall: Normal insp   Abdomen:  Soft, NT/ND, normal bowel sounds, no HSM, no masses.  No peritoneal signs.   Back: spine normal without deformity or tenderness.  Normal ROM   Extremities: chronic swelling of lower extremeties  Neurologic: CN 2-12 normal. Sensation to pain, touch and proprioception normal. DTRs normal in upper and lower extremities. No pathologic reflexes.  Motor normal.  Psychiatric: Oriented X3, intact recent and remote memory, judgement and insight, normal mood and affect.                          12.2   9.62  )-----------( 116      ( 13 Dec 2024 00:36 )             37.4         140  |  108  |  17  ----------------------------<  107[H]  3.0[L]   |  26  |  0.69    Ca    9.2      13 Dec 2024 00:36    TPro  6.8  /  Alb  3.5  /  TBili  1.3[H]  /  DBili  x   /  AST  128[H]  /  ALT  37  /  AlkPhos  62      CAPILLARY BLOOD GLUCOSE      POCT Blood Glucose.: 101 mg/dL (12 Dec 2024 23:51)    PT/INR - ( 13 Dec 2024 01:46 )   PT: 14.6 sec;   INR: 1.24 ratio         PTT - ( 13 Dec 2024 01:46 )  PTT:29.1 sec  Urinalysis Basic - ( 13 Dec 2024 02:30 )    Color: Yellow / Appearance: Turbid / S.015 / pH: x  Gluc: x / Ketone: 40 mg/dL  / Bili: Negative / Urobili: 0.2 mg/dL   Blood: x / Protein: 30 mg/dL / Nitrite: Negative   Leuk Esterase: Negative / RBC: 51 /HPF / WBC 0 /HPF   Sq Epi: x / Non Sq Epi: 0 /HPF / Bacteria: Negative /HPF          Radiology/Imaging, I have personally reviewed:    CT CAP   IMPRESSION:  1.  No acute thoracoabdominal pathology.  2.  13 mm RIGHT T12 body sclerotic lesion (developed from 3/4/2022)   should be viewed with suspicion in patient with prostate cancer  3.  Minimal/ 7mm nonspecific pancreatic neck hypodensity ()and   adjacent 4 mm head cyst (). Correlation with comorbidities may   determine need for primary characterization with contrast MRI    EKG Sinus rhythmn with pvs prolonged qt qtc 504

## 2024-12-13 NOTE — PHYSICAL THERAPY INITIAL EVALUATION ADULT - SKIN INTEGRITY
the entire R buttock with shearing away of superficial skin ( like a rug burn) with reddened exposed tissue that is very raw,tender/painful per patient/excoriation/dryness/skin tear the entire R buttock with shearing away of superficial skin ( like a rug burn) with reddened exposed tissue that is very raw,tender/painful per patient, foam dressing over sacrococcygeal spine  and reddish-purple ring like kash ( likely from toilet seat ) noted to posteromedial thighs when inspected from behind/excoriation/dryness/skin tear/pressure injury

## 2024-12-13 NOTE — PHYSICAL THERAPY INITIAL EVALUATION ADULT - PATIENT PROFILE REVIEW, REHAB EVAL
lives alone, no next of kin, concerned neighbor Regis Fraire requested a wellness check as pt not seen in few days/yes

## 2024-12-13 NOTE — PATIENT PROFILE ADULT - STATED REASON FOR ADMISSION
DIAGNOSIS:  No diagnosis found.    PROCEDURE:    The patient was placed supine on the exam table and the proximal medial aspect of the left tibia and anterior aspect of distal femur was prepped with sterile Betadine and alcohol.  A line was drawn extending approximately 5 cm medial to inferior pole of the patella distally to a point approximately 5 cm medial to the tibial tubercle.  A second line was drawn in a medial to lateral direction the width of the patella approximately *** cm proximal to the center of the patella. We then infiltrated the skin with lidocaine along both lines using a 22g needle. We then introduced the cryotherapy device along these lines and this device penetrated the skin, creating cryotherapy to both branches of the infrapatellar saphenous nerve and a third treatment to the anterior femoral cutaneous nerve. *** punctures of the skin were made to treat the 2 branches of the ISN and another *** punctures were made to treat the AFCN. There were a total of 3 nerves treated with cryotherapy. The patient tolerated the procedure well with no problems.                                                                                        Supervising Physician:  Dr. Sky López DO   
weakness

## 2024-12-13 NOTE — PHYSICAL THERAPY INITIAL EVALUATION ADULT - RANGE OF MOTION EXAMINATION, REHAB EVAL
mild difficulty elevating R >>L legs off mattress in part due to R buttock discomfort associated with acute skin injuries/deficits as listed below

## 2024-12-13 NOTE — PHYSICAL THERAPY INITIAL EVALUATION ADULT - PHYSICAL ASSIST/NONPHYSICAL ASSIST: SIT/STAND, REHAB EVAL
stood from partially elevated bed with modA1 and from seat of high back chair ( low seat) with modA2/1 person assist/2 person assist

## 2024-12-13 NOTE — PHYSICAL THERAPY INITIAL EVALUATION ADULT - SKIN MOISTURE
pt reports meds for prostate CA cause him to have to urinate frequently; he has a urinal positioned between his legs with the cap on and he has urinated around the top soaking the bed pad ;

## 2024-12-13 NOTE — PHYSICAL THERAPY INITIAL EVALUATION ADULT - PLANNED THERAPY INTERVENTIONS, PT EVAL
n/a needs WOUND CARE too/bed mobility training/gait training/postural re-education/ROM/strengthening/transfer training

## 2024-12-13 NOTE — PHYSICAL THERAPY INITIAL EVALUATION ADULT - GENERAL OBSERVATIONS, REHAB EVAL
Pt reclined in bed with urinal tucked bewteen legs with CAP ON; Pt awake,alert,interactive, ?tic noted with involuntary movement of head ( bobble-head type) and tremors B hands ,moderate fwd head alignment ,sever venous stasis changes B lower legs>>feet ; pt tells me the last time he left his home was about 1 week ago , he is unsure how long he was stuck on the toilet

## 2024-12-13 NOTE — PHYSICAL THERAPY INITIAL EVALUATION ADULT - ACTIVE RANGE OF MOTION EXAMINATION, REHAB EVAL
A/AAROM BLEs mildly limited by edema/trophic skin changes,sensitivity B legs & feet/salinas. upper extremity Active ROM was WNL (within normal limits)/bilateral  lower extremity Active ROM was WFL (within functional limits) A/AAROM BLEs mildly limited by edema/trophic skin changes,sensitivity B legs & feet, has trouble bending knees sufficiently to place feet under body when transferring sit to stand from bed/chair/salinas. upper extremity Active ROM was WNL (within normal limits)/bilateral  lower extremity Active ROM was WFL (within functional limits)

## 2024-12-13 NOTE — PHYSICAL THERAPY INITIAL EVALUATION ADULT - GAIT TRAINING, PT EVAL
2 weeks: Amb with least restrictive assistive device > 200ft with rests as needed with improved posture,fluidity

## 2024-12-13 NOTE — ED ADULT NURSE REASSESSMENT NOTE - NS ED NURSE REASSESS COMMENT FT1
Attempted to ambulate pt. pt unable to ambulate and endorsing weakness. pt placed back into bed. Pt turned and positioned. Wound noted to sacral area. Pt reports wound has developed PTA. Pt at bedside and updated on plan of care. safety and comfort maintained.

## 2024-12-13 NOTE — ED ADULT NURSE REASSESSMENT NOTE - NS ED NURSE REASSESS COMMENT FT1
Report received from RAJESH Roach. Pt is A&Ox3. Pt denies any complaints at this time. Pt does not appear to be in discomfort or acute distress at this time. Pt updated on plan of care. Friend at bedside. Safety and comfort maintained.

## 2024-12-13 NOTE — ED ADULT NURSE NOTE - NS ED NURSE RECORD ANOTHER HT AND WT
Yes
  DIANELYS RICH  46y, Male  Allergy: No Known Allergies      All historical available data reviewed.    HPI:  47 y/o M w/ a hx of recent COVID+ (8/21) and GERD, c/o midsternal chest tightness x 2 days (describes like "elephant on chest" located misternal. Non smoker. No family history of CAD.   States he has been checking his HR at home and has been in 110-120s and pulse ox has been in 96-97%.   No chest pain in ED.    ED:  ·	VS: wnlexcept for   ·	ECG: Sinus Tachy x2; No ischemic changes.  ·	labs: Tn(-) x2; ddimer 229  ·	CXR: Mild peripheral predominant patchy lung opacities. No focal consolidation.  ·	CTA: Nondiagnostic examination for pulmonary embolism due to inadequate opacification of the pulmonary arteries despite multiple attempts. No gross large central pulmonary trunk filling defect seen.      Admit to 3A for repeat CTA   (11 Sep 2021 13:19)    FAMILY HISTORY:  No pertinent family history in first degree relatives      PAST MEDICAL & SURGICAL HISTORY:  Dyslipidemia          VITALS:  T(F): 98, Max: 99.4 (09-10-21 @ 23:27)  HR: 98  BP: 128/82  RR: 18Vital Signs Last 24 Hrs  T(C): 36.7 (11 Sep 2021 16:00), Max: 37.4 (10 Sep 2021 23:27)  T(F): 98 (11 Sep 2021 16:00), Max: 99.4 (10 Sep 2021 23:27)  HR: 98 (11 Sep 2021 16:00) (89 - 105)  BP: 128/82 (11 Sep 2021 16:00) (118/77 - 134/84)  BP(mean): 99 (11 Sep 2021 16:00) (93 - 99)  RR: 18 (11 Sep 2021 16:00) (18 - 20)  SpO2: 98% (11 Sep 2021 16:00) (95% - 100%)    TESTS & MEASUREMENTS:                        14.8   12.45 )-----------( 431      ( 10 Sep 2021 14:10 )             45.2     09-10    139  |  100  |  18  ----------------------------<  115<H>  4.8   |  26  |  1.0    Ca    10.0      10 Sep 2021 14:10  Mg     2.0     09-10    TPro  7.2  /  Alb  4.1  /  TBili  0.4  /  DBili  x   /  AST  22  /  ALT  51<H>  /  AlkPhos  79  09-10    LIVER FUNCTIONS - ( 10 Sep 2021 14:10 )  Alb: 4.1 g/dL / Pro: 7.2 g/dL / ALK PHOS: 79 U/L / ALT: 51 U/L / AST: 22 U/L / GGT: x                   RADIOLOGY & ADDITIONAL TESTS:  Personal review of radiological diagnostics performed  Echo and EKG results noted when applicable.     MEDICATIONS:  chlorhexidine 4% Liquid 1 Application(s) Topical <User Schedule>  dexAMETHasone     Tablet 6 milliGRAM(s) Oral daily  pantoprazole    Tablet 40 milliGRAM(s) Oral before breakfast  remdesivir  IVPB   IV Intermittent   sodium chloride 0.9%. 1000 milliLiter(s) IV Continuous <Continuous>      ANTIBIOTICS:  remdesivir  IVPB   IV Intermittent

## 2024-12-13 NOTE — PHYSICAL THERAPY INITIAL EVALUATION ADULT - SKIN COLOR/CHARACTERISTICS
pt with enlarged scrotum, retracted penis ; severe venous stasis B distal LEs; there are sever/advanced /chronic trophic skin changes over the distal lower legs B and feet with darkened/thickened,scaley skin ,area of skin tear noted at proximal edge of affected skin on L anterior lower leg, there is a pocket of swelling to the proximal posteromedial calf that pt attributes to "blood clot" with + h/o DVT ( on Eliquis)/bruised (ecchymotic)/redness nonblanchable pt with enlarged scrotum, retracted penis ; severe venous stasis B distal LEs; there are sever/advanced /chronic trophic skin changes over the distal lower legs B and feet with darkened/thickened,scaley skin ,area of skin tear noted at proximal edge of affected skin on L anterior lower leg, there is a pocket of swelling to the proximal posteromedial calf that pt attributes to "blood clot" with + h/o DVT ( on Eliquis)/bruised (ecchymotic)/redness blanchable/redness nonblanchable

## 2024-12-13 NOTE — ED ADULT NURSE NOTE - CHIEF COMPLAINT QUOTE
Pt BIBEMS from home with c/o AMS since unknown time. Per EMS, pt was repeatedly calling friend and not making sense stating he's been on the toilet since last night. EMS reports police showed up to house for welfare check and found pt sitting on toilet. Skin tear noted to palm of left hand from "holding onto rails". Pt endorsing right foot pain. B/L feet appear edematous and pale with b/l mottled lower extremities. Pt unable to lift up b/l LE. BEFAST NEGATIVE. MD Jimenez called for eval, no code called.  in triage, A&Ox3, endorses allergies but pt and EMS unsure to which medications. hx of cancer and parkinson's. Xray Knee 3 Views, Left

## 2024-12-13 NOTE — ED ADULT NURSE REASSESSMENT NOTE - NS ED NURSE REASSESS COMMENT FT1
Patient repositioned at this time, cradled with pillows for comfort, blankets provided, alleyvn dressing placed to right and left hip. Right hip with large skin tear, left hip with blanchable redness and skin tear. Bruising noted to left ischium. Call bell in reach.

## 2024-12-13 NOTE — PHYSICAL THERAPY INITIAL EVALUATION ADULT - NSACTIVITYREC_GEN_A_PT
Offload R buttock using sprye pillow or waffle cushion, out of bed to chair with waffle cushion daily ( ie-meals ) higher seat recommended with armrests for push off,Amb with RW and 1PA/chair follow as needed until strength & mobility improved

## 2024-12-13 NOTE — PHYSICAL THERAPY INITIAL EVALUATION ADULT - BALANCE DISTURBANCE, IDENTIFIED IMPAIRMENT CONTRIBUTE, REHAB EVAL
c/o R buttock discomfort/pain due to acute skin injuries/pain/impaired postural control/decreased strength

## 2024-12-13 NOTE — ED PROVIDER NOTE - PHYSICAL EXAMINATION
GEN - NAD; well appearing; A+O x2  HEAD - NC/AT    EYES - EOMI, no conjunctival pallor, no scleral icterus  ENT -   mucous membranes  moist , no discharge  PULM - CTA b/l,  symmetric breath sounds  COR -  RRR, S1 S2, no murmurs  ABD - ND, NT, soft, no guarding, no rebound, no masses    EXTREMS -Chronic lymphedema  SKIN - no rash or bruising      NEUROLOGIC - alert, sensation nl, motor 5/5 RUE/LUE/RLE/LLE

## 2024-12-13 NOTE — PHYSICAL THERAPY INITIAL EVALUATION ADULT - PERTINENT HX OF CURRENT PROBLEM, REHAB EVAL
78 yo M pmhx prostate cancer undergoing treatment, DVT on eliquis, hypotension BIBEMS after wellness check. Pt lives a lone has no next of kin neighbor called wellness check on patient because he had not seen him for a few days. Pt was found conscious sitting on the toilet but confused unsure of how long he had been in the bathroom. He denied any head strike, LOC. Pt with no acute complaints. CT Head  unremarkable and CT Chest/Abdomen/Pelvis notable for T12 lesion and minimal non specific pancreatic hypodensity. Pt evaluated bedside now AOX3 able to tell me name, date and where he is. 78 yo M PMH prostate cancer ( followed by Dr Lozano)  undergoing treatment, DVT on Eliquis, hypotension BIBEMS after wellness check. Pt lives a lone has no next of kin neighbor called wellness check on patient because he had not seen him for a few days. Pt was found conscious sitting on the toilet but confused , unsure of how long he had been in the bathroom. He denied any head strike, LOC. Pt with no acute complaints. CT Head  unremarkable and CT Chest/Abdomen/Pelvis notable for T12 lesion and minimal non specific pancreatic hypodensity. Pt evaluated bedside now AOX3 able to tell me name, date and where he is.

## 2024-12-13 NOTE — PHARMACOTHERAPY INTERVENTION NOTE - COMMENTS
Med history complete. Patient unable to provide medication history.  Called emergency contact numbers with no answer.  Confirmed medication list with doctor first med profile, and [facility] pharmacy records [phone]

## 2024-12-13 NOTE — PHYSICAL THERAPY INITIAL EVALUATION ADULT - DIAGNOSIS, PT EVAL
AMS, prostate CA , new R T12 sclerotic lesion r/o metastatic disease AMS, prostate CA , new R T12 sclerotic lesion r/o metastatic disease, severe venous stasis with advanced/chronic /trophic skin changes B distal legs/feet

## 2024-12-14 LAB
ALBUMIN SERPL ELPH-MCNC: 2.4 G/DL — LOW (ref 3.3–5)
ALP SERPL-CCNC: 49 U/L — SIGNIFICANT CHANGE UP (ref 40–120)
ALT FLD-CCNC: 38 U/L — SIGNIFICANT CHANGE UP (ref 12–78)
ANION GAP SERPL CALC-SCNC: 2 MMOL/L — LOW (ref 5–17)
AST SERPL-CCNC: 90 U/L — HIGH (ref 15–37)
BASOPHILS # BLD AUTO: 0.02 K/UL — SIGNIFICANT CHANGE UP (ref 0–0.2)
BASOPHILS NFR BLD AUTO: 0.3 % — SIGNIFICANT CHANGE UP (ref 0–2)
BILIRUB SERPL-MCNC: 0.8 MG/DL — SIGNIFICANT CHANGE UP (ref 0.2–1.2)
BUN SERPL-MCNC: 17 MG/DL — SIGNIFICANT CHANGE UP (ref 7–23)
CALCIUM SERPL-MCNC: 9.3 MG/DL — SIGNIFICANT CHANGE UP (ref 8.5–10.1)
CHLORIDE SERPL-SCNC: 110 MMOL/L — HIGH (ref 96–108)
CO2 SERPL-SCNC: 30 MMOL/L — SIGNIFICANT CHANGE UP (ref 22–31)
CREAT SERPL-MCNC: 0.6 MG/DL — SIGNIFICANT CHANGE UP (ref 0.5–1.3)
EGFR: 98 ML/MIN/1.73M2 — SIGNIFICANT CHANGE UP
EOSINOPHIL # BLD AUTO: 0.15 K/UL — SIGNIFICANT CHANGE UP (ref 0–0.5)
EOSINOPHIL NFR BLD AUTO: 2.2 % — SIGNIFICANT CHANGE UP (ref 0–6)
GLUCOSE SERPL-MCNC: 107 MG/DL — HIGH (ref 70–99)
HCT VFR BLD CALC: 33.7 % — LOW (ref 39–50)
HGB BLD-MCNC: 10.8 G/DL — LOW (ref 13–17)
IMM GRANULOCYTES NFR BLD AUTO: 0.4 % — SIGNIFICANT CHANGE UP (ref 0–0.9)
IRON SATN MFR SERPL: 36 UG/DL — LOW (ref 45–165)
LYMPHOCYTES # BLD AUTO: 0.25 K/UL — LOW (ref 1–3.3)
LYMPHOCYTES # BLD AUTO: 3.7 % — LOW (ref 13–44)
MCHC RBC-ENTMCNC: 30.7 PG — SIGNIFICANT CHANGE UP (ref 27–34)
MCHC RBC-ENTMCNC: 32 G/DL — SIGNIFICANT CHANGE UP (ref 32–36)
MCV RBC AUTO: 95.7 FL — SIGNIFICANT CHANGE UP (ref 80–100)
MONOCYTES # BLD AUTO: 0.66 K/UL — SIGNIFICANT CHANGE UP (ref 0–0.9)
MONOCYTES NFR BLD AUTO: 9.9 % — SIGNIFICANT CHANGE UP (ref 2–14)
NEUTROPHILS # BLD AUTO: 5.59 K/UL — SIGNIFICANT CHANGE UP (ref 1.8–7.4)
NEUTROPHILS NFR BLD AUTO: 83.5 % — HIGH (ref 43–77)
PLATELET # BLD AUTO: 107 K/UL — LOW (ref 150–400)
POTASSIUM SERPL-MCNC: 3.7 MMOL/L — SIGNIFICANT CHANGE UP (ref 3.5–5.3)
POTASSIUM SERPL-SCNC: 3.7 MMOL/L — SIGNIFICANT CHANGE UP (ref 3.5–5.3)
PROT SERPL-MCNC: 5.5 GM/DL — LOW (ref 6–8.3)
RBC # BLD: 3.52 M/UL — LOW (ref 4.2–5.8)
RBC # FLD: 13.1 % — SIGNIFICANT CHANGE UP (ref 10.3–14.5)
SODIUM SERPL-SCNC: 142 MMOL/L — SIGNIFICANT CHANGE UP (ref 135–145)
WBC # BLD: 6.7 K/UL — SIGNIFICANT CHANGE UP (ref 3.8–10.5)
WBC # FLD AUTO: 6.7 K/UL — SIGNIFICANT CHANGE UP (ref 3.8–10.5)

## 2024-12-14 PROCEDURE — 99233 SBSQ HOSP IP/OBS HIGH 50: CPT

## 2024-12-14 RX ADMIN — APIXABAN 5 MILLIGRAM(S): 2.5 TABLET, FILM COATED ORAL at 09:35

## 2024-12-14 NOTE — DIETITIAN INITIAL EVALUATION ADULT - ORAL INTAKE PTA/DIET HISTORY
Lives alone with no support system.  Reports he consumes 2 meals/day with most from the deli, occasional meals from the diner.  No specific details provided for diet recall however reports breakfast is usually an egg sandwich. Likely meeting 50-75% ENN x > 1M

## 2024-12-14 NOTE — CONSULT NOTE ADULT - ASSESSMENT
78 yo M pmhx locally advanced prostate cancer, DVT on eliquis, hypotension BIBEMS for AMS    1) Locally advanced prostate ca  follows with Dr Lozano of our practice  Currently on  eligard and transitioning to lupron, recently stopped zytiga due to weakness  last eligard on 12/11  last psa 0.02    2) AMS  mental status improving since admission  CTH unremarkable  PT to follow  will need neurology outpt  plan for dc to [facility] when more stable    Thank you for the courtesy of this consultation and we will continue to follow.    Danny Beth MD  [facility]  Cell: [phone]       78 yo M pmhx locally advanced prostate cancer, DVT on eliquis, hypotension BIBEMS for AMS    1) Locally advanced prostate ca  follows with Dr Lozano of our practice  Currently on  eligard and transitioning to lupron, recently stopped zytiga due to weakness  last eligard on 12/11  last psa 0.02  t12 lesion seen on our psma pet from 01/24    2) AMS  mental status improving since admission  CTH unremarkable  PT to follow  will need neurology outpt  plan for dc to [facility] when more stable    3) Panc lesion  not seen on our pet scan from 01/24  will need outpt MRI    Thank you for the courtesy of this consultation and we will continue to follow.    Danny Beth MD  [facility]  Cell: [phone]

## 2024-12-14 NOTE — OCCUPATIONAL THERAPY INITIAL EVALUATION ADULT - ADL RETRAINING, OT EVAL
Patient will participate in lower body dressing with MOD A    in 2 weeks  Patient will participate in toilet transfer with MOD A   in 2 weeks  Patient will participate in toileting with  MOD A    in 2 weeks  Patient will participate in grooming standing at the sink with MOD A   in 2 weeks

## 2024-12-14 NOTE — CONSULT NOTE ADULT - SUBJECTIVE AND OBJECTIVE BOX
Reason for consult: prostate ca    HPI:     HPI: 78 yo M pmhx locally advanced  prostate cancer undergoing treatment with lupron, DVT on eliquis, hypotension BIBEMS after he was found conscious sitting on the toilet but confused unsure of how long he had been in the bathroom. He denied any head strike, LOC. Pt with no acute complaints. CTH unremarkable and CT CAP notable for T12 lesion and minimal non specific pancreatic hypodensity. He is now resting and alert with tremor with no major complaints        PAST MEDICAL & SURGICAL HISTORY:  Prostate cancer  DVT, lower extremity    FAMILY HISTORY:not pertinent     Social History:  lives alone no NOK     Allergies    Allergy Status Unknown    Intolerances        MEDICATIONS  (STANDING):  potassium chloride    Tablet ER 40 milliEquivalent(s) Oral every 4 hours    MEDICATIONS  (PRN):  acetaminophen     Tablet .. 650 milliGRAM(s) Oral every 6 hours PRN Temp greater or equal to 38C (100.4F), Mild Pain (1 - 3)  aluminum hydroxide/magnesium hydroxide/simethicone Suspension 30 milliLiter(s) Oral every 4 hours PRN Dyspepsia  melatonin 3 milliGRAM(s) Oral at bedtime PRN Insomnia  ondansetron Injectable 4 milliGRAM(s) IV Push every 8 hours PRN Nausea and/or Vomiting      ROS:  General:  No fevers, chills, or unexplained weight loss  Skin: No rash or bothersome skin lesions  Musculoskeletal: No arthalgias, myalgias or joint swelling  Eyes: No visual changes or eye pain  Ears: No hearing loss , otorrhea or ear pain  Nose, Mouth, Throat: No nasal congestion, rhinorrhea, oral lesions, postnasal drip or sore throat  Cardio: No chest pain or palpitations. no lower extremity edema. no syncope. no claudication.   Respiratory: No cough, shortness of breath or wheezing   GI: No diarrhea, constipation, blood in stools, abdominal pain, vomiting or heartburn  : No urinary frequency, hematuria, incontinence, or dysuria  Neurologic: No headaches, parasthesias, confusion, dysarthria or gait instability  Psychiatric:  No anxiety or depression  Lymphatic:  No easy bruising, easy bleeding or swollen glands  Allergic: No itching, sneezing , watery eyes, clear rhinorrhea or recurrent infections    PEx  T(C): 36.7 (24 @ 09:49), Max: 36.8 (24 @ 23:50)  HR: 76 (24 @ 09:49) (76 - 92)  BP: 134/70 (24 @ 09:49) (126/68 - 139/71)  RR: 18 (24 @ 09:49) (15 - 18)  SpO2: 99% (24 @ 09:49) (95% - 100%)  Wt(kg): --                            12.2   9.62  )-----------( 116      ( 13 Dec 2024 00:36 )             37.4         140  |  108  |  17  ----------------------------<  107[H]  3.0[L]   |  26  |  0.69    Ca    9.2      13 Dec 2024 00:36    TPro  6.8  /  Alb  3.5  /  TBili  1.3[H]  /  DBili  x   /  AST  128[H]  /  ALT  37  /  AlkPhos  62      CAPILLARY BLOOD GLUCOSE      POCT Blood Glucose.: 101 mg/dL (12 Dec 2024 23:51)    PT/INR - ( 13 Dec 2024 01:46 )   PT: 14.6 sec;   INR: 1.24 ratio         PTT - ( 13 Dec 2024 01:46 )  PTT:29.1 sec  Urinalysis Basic - ( 13 Dec 2024 02:30 )    Color: Yellow / Appearance: Turbid / S.015 / pH: x  Gluc: x / Ketone: 40 mg/dL  / Bili: Negative / Urobili: 0.2 mg/dL   Blood: x / Protein: 30 mg/dL / Nitrite: Negative   Leuk Esterase: Negative / RBC: 51 /HPF / WBC 0 /HPF   Sq Epi: x / Non Sq Epi: 0 /HPF / Bacteria: Negative /HPF          Radiology/Imaging, I have personally reviewed:    CT CAP   IMPRESSION:  1.  No acute thoracoabdominal pathology.  2.  13 mm RIGHT T12 body sclerotic lesion (developed from 3/4/2022)   should be viewed with suspicion in patient with prostate cancer  3.  Minimal/ 7mm nonspecific pancreatic neck hypodensity ()and   adjacent 4 mm head cyst (). Correlation with comorbidities may   determine need for primary characterization with contrast MRI    EKG Sinus rhythmn with pvs prolonged qt qtc 504      (13 Dec 2024 11:37)      PAST MEDICAL & SURGICAL HISTORY:  No pertinent past medical history      Prostate cancer      DVT, lower extremity          FAMILY HISTORY:      Alochol: Denied  Smoking: Nonsmoker  Drug Use: Denied  Marital Status:         Allergies    Allergy Status Unknown    Intolerances        MEDICATIONS  (STANDING):  apixaban 5 milliGRAM(s) Oral every 12 hours  influenza  Vaccine (HIGH DOSE) 0.5 milliLiter(s) IntraMuscular once  predniSONE   Tablet 5 milliGRAM(s) Oral two times a day    MEDICATIONS  (PRN):  acetaminophen     Tablet .. 650 milliGRAM(s) Oral every 6 hours PRN Temp greater or equal to 38C (100.4F), Mild Pain (1 - 3)  aluminum hydroxide/magnesium hydroxide/simethicone Suspension 30 milliLiter(s) Oral every 4 hours PRN Dyspepsia  melatonin 3 milliGRAM(s) Oral at bedtime PRN Insomnia  ondansetron Injectable 4 milliGRAM(s) IV Push every 8 hours PRN Nausea and/or Vomiting      ROS  No fever, sweats, chills  No epistaxis, HA, sore throat  No CP, SOB, cough, sputum  No n/v/d, abd pain, melena, hematochezia  No edema  No rash  No anxiety  No back pain, joint pain  No bleeding, bruising  No dysuria, hematuria    T(C): 36.8 (24 @ 14:57), Max: 36.8 (24 @ 14:57)  HR: 78 (24 @ 14:57) (72 - 85)  BP: 106/59 (24 @ 14:57) (106/59 - 133/74)  RR: 18 (24 @ 14:57) (17 - 19)  SpO2: 97% (24 @ 14:57) (97% - 100%)  Wt(kg): --    PE  NAD, + TREMOR    No c/c/e  No rash grossly  FROM                          10.8   6.70  )-----------( 107      ( 14 Dec 2024 07:26 )             33.7           142  |  110[H]  |  17  ----------------------------<  107[H]  3.7   |  30  |  0.60    Ca    9.3      14 Dec 2024 07:26    TPro  5.5[L]  /  Alb  2.4[L]  /  TBili  0.8  /  DBili  x   /  AST  90[H]  /  ALT  38  /  AlkPhos  49  12-14

## 2024-12-14 NOTE — DIETITIAN INITIAL EVALUATION ADULT - OTHER INFO
HPI:78 yo M pmhx prostate cancer undergoing treatment, DVT on eliquis, hypotension BIBEMS after wellness check. Pt lives a lone has no next of kin neighbor called wellness check on patient because he had not seen him for a few days. Pt was found conscious sitting on the toilet but confused unsure of how long he had been in the bathroom. He denied any head strike, LOC. Pt with no acute complaints. CTH unremarkable and CT CAP notable for T12 lesion and minimal non specific pancreatic hypodensity. Pt evaluated bedside now AOX3 able to tell me name, date and where he is.   Admitted for Altered mental status    Breakfast tray present upon RD arrival (pancakes and scrambled eggs with pudding and OJ). Patient was having trouble cutting food, RD assisted with meal set up.  Pt actively consumed breakfast during RD visit.  Prescribed a DASH/TLC diet at . Recommend to liberalize diet to regular to maximize caloric and nutrient intake.  No weight history available for review.  Based on bed scale wt obtained by RD on 12/14 Pt wt is 149.8# - noted with 3+ edema to BL feet, could be masking losses, skewing appearance.  Pt reports UBW ~172# x unknown timeframe - loss of 22#/14.8%, not clin sig. Patient appears appropriate weight for height however, NFPE revealed moderate to severe muscle/fat wasting.  Recommend to add Ensure + HP shake TID to optimize nutritional needs (provides 350 kcal, 20g protein/ shake).  Recommend to add MVI w/minerals, Vit C 500 mg BID, add Zinc Sulfate 220 mg x 10 days to promote wound healing.  MOLST present in shadow chart, tube feeding GOC blank, confirm goals of care regarding nutrition support.  See additional recommendations below.

## 2024-12-14 NOTE — OCCUPATIONAL THERAPY INITIAL EVALUATION ADULT - NSACTIVITYREC_GEN_A_OT
Pt presents with impaired fine  motor dexterity (tremors due to medication) balance, endurance and muscle strength that will benefit from skilled OT to improve independence in ADLs, reduce fall risk and chance for readmission

## 2024-12-14 NOTE — DIETITIAN INITIAL EVALUATION ADULT - NSFNSPHYEXAMSKINFT_GEN_A_CORE
Pressure Injury 1: coccyx, Stage II  Pressure Injury 2: Left:, proximal, buttocks, Stage I  Pressure Injury 3: Right:, buttocks, Suspected deep tissue injury  Pressure Injury 4: Bilateral:, buttocks, Suspected deep tissue injury  Chris score 14  ecchymotic

## 2024-12-14 NOTE — DIETITIAN INITIAL EVALUATION ADULT - PERTINENT LABORATORY DATA
12-14    142  |  110[H]  |  17  ----------------------------<  107[H]  3.7   |  30  |  0.60    Ca    9.3      14 Dec 2024 07:26    TPro  5.5[L]  /  Alb  2.4[L]  /  TBili  0.8  /  DBili  x   /  AST  90[H]  /  ALT  38  /  AlkPhos  49  12-14

## 2024-12-14 NOTE — DIETITIAN INITIAL EVALUATION ADULT - PERTINENT MEDS FT
MEDICATIONS  (STANDING):  apixaban 5 milliGRAM(s) Oral every 12 hours  influenza  Vaccine (HIGH DOSE) 0.5 milliLiter(s) IntraMuscular once  predniSONE   Tablet 5 milliGRAM(s) Oral two times a day    MEDICATIONS  (PRN):  acetaminophen     Tablet .. 650 milliGRAM(s) Oral every 6 hours PRN Temp greater or equal to 38C (100.4F), Mild Pain (1 - 3)  aluminum hydroxide/magnesium hydroxide/simethicone Suspension 30 milliLiter(s) Oral every 4 hours PRN Dyspepsia  melatonin 3 milliGRAM(s) Oral at bedtime PRN Insomnia  ondansetron Injectable 4 milliGRAM(s) IV Push every 8 hours PRN Nausea and/or Vomiting

## 2024-12-14 NOTE — DIETITIAN INITIAL EVALUATION ADULT - ETIOLOGY
r/t decreased ability to meet increased nutrient needs 2/2 advanced age, AMS, Pressure injury stage 2 or >, CA

## 2024-12-14 NOTE — DIETITIAN INITIAL EVALUATION ADULT - ADD RECOMMEND
1) Liberalize diet to regular to maximize caloric and nutrient intake.   2) Encourage protein-rich foods, maximize food preferences   3) Add Ensure + HP shake TID to optimize nutritional needs (provides 350 kcal, 20g protein/ shake)   4) Recommend to add MVI w/minerals, Vit C 500 mg BID, add Zinc Sulfate 220 mg x 10 days to promote wound healing.   5) Consider adding thiamine 100 mg daily 2/2 poor PO intake/ malnutrition   6) Monitor bowel movements, if no BM for >3 days, consider implementing bowel regimen.   7) Obtain weekly wt to track/trend changes   8) Confirm goals of care regarding nutrition support   RD will continue to monitor PO intake, labs, hydration, and wt prn.

## 2024-12-14 NOTE — OCCUPATIONAL THERAPY INITIAL EVALUATION ADULT - GENERAL OBSERVATIONS, REHAB EVAL
Pt received edge of bed towards the edge with aides at bedside. OT/PT present and transfer pt back to supine to readjust and then transfer to chair.

## 2024-12-14 NOTE — OCCUPATIONAL THERAPY INITIAL EVALUATION ADULT - LIVES WITH, PROFILE
resides in [place], lives alone, 5 steps + 1 FOS, walk in shower,  however sponge bathes baseline, standard toilet, IND prior/alone

## 2024-12-14 NOTE — OCCUPATIONAL THERAPY INITIAL EVALUATION ADULT - PERTINENT HX OF CURRENT PROBLEM, REHAB EVAL
80 yo M PMH prostate cancer ( followed by Dr Lozano)  undergoing treatment, DVT on Eliquis, hypotension BIBEMS after wellness check. Pt lives a lone has no next of kin neighbor called wellness check on patient because he had not seen him for a few days. Pt was found conscious sitting on the toilet but confused , unsure of how long he had been in the bathroom. He denied any head strike, LOC. Pt with no acute complaints. CT Head  unremarkable and CT Chest/Abdomen/Pelvis notable for T12 lesion and minimal non specific pancreatic hypodensity. Pt evaluated bedside now AOX3 able to tell me name, date and where he is.

## 2024-12-15 PROCEDURE — 99233 SBSQ HOSP IP/OBS HIGH 50: CPT

## 2024-12-15 RX ADMIN — PREDNISONE 5 MILLIGRAM(S): 20 TABLET ORAL at 10:03

## 2024-12-15 RX ADMIN — APIXABAN 5 MILLIGRAM(S): 2.5 TABLET, FILM COATED ORAL at 22:47

## 2024-12-15 RX ADMIN — PREDNISONE 5 MILLIGRAM(S): 20 TABLET ORAL at 22:46

## 2024-12-15 RX ADMIN — APIXABAN 5 MILLIGRAM(S): 2.5 TABLET, FILM COATED ORAL at 10:04

## 2024-12-16 PROCEDURE — 99231 SBSQ HOSP IP/OBS SF/LOW 25: CPT

## 2024-12-16 RX ADMIN — APIXABAN 5 MILLIGRAM(S): 2.5 TABLET, FILM COATED ORAL at 10:40

## 2024-12-16 RX ADMIN — APIXABAN 5 MILLIGRAM(S): 2.5 TABLET, FILM COATED ORAL at 21:42

## 2024-12-16 RX ADMIN — ACETAMINOPHEN, DIPHENHYDRAMINE HCL, PHENYLEPHRINE HCL 3 MILLIGRAM(S): 325; 25; 5 TABLET ORAL at 21:43

## 2024-12-16 NOTE — PROGRESS NOTE ADULT - NUTRITIONAL ASSESSMENT
This patient has been assessed with a concern for Malnutrition and has been determined to have a diagnosis/diagnoses of Severe protein-calorie malnutrition.    This patient is being managed with:   Diet DASH/TLC-  Sodium & Cholesterol Restricted  Entered: Dec 13 2024  9:45AM  

## 2024-12-17 ENCOUNTER — TRANSCRIPTION ENCOUNTER (OUTPATIENT)
Age: 79
End: 2024-12-17

## 2024-12-17 VITALS
HEART RATE: 92 BPM | DIASTOLIC BLOOD PRESSURE: 65 MMHG | RESPIRATION RATE: 18 BRPM | OXYGEN SATURATION: 98 % | TEMPERATURE: 98 F | SYSTOLIC BLOOD PRESSURE: 119 MMHG

## 2024-12-17 LAB
ANION GAP SERPL CALC-SCNC: 3 MMOL/L — LOW (ref 5–17)
BUN SERPL-MCNC: 20 MG/DL — SIGNIFICANT CHANGE UP (ref 7–23)
CALCIUM SERPL-MCNC: 9.2 MG/DL — SIGNIFICANT CHANGE UP (ref 8.5–10.1)
CHLORIDE SERPL-SCNC: 107 MMOL/L — SIGNIFICANT CHANGE UP (ref 96–108)
CO2 SERPL-SCNC: 31 MMOL/L — SIGNIFICANT CHANGE UP (ref 22–31)
CREAT SERPL-MCNC: 0.63 MG/DL — SIGNIFICANT CHANGE UP (ref 0.5–1.3)
EGFR: 96 ML/MIN/1.73M2 — SIGNIFICANT CHANGE UP
GLUCOSE SERPL-MCNC: 118 MG/DL — HIGH (ref 70–99)
HCT VFR BLD CALC: 37.8 % — LOW (ref 39–50)
HGB BLD-MCNC: 11.8 G/DL — LOW (ref 13–17)
MCHC RBC-ENTMCNC: 30.3 PG — SIGNIFICANT CHANGE UP (ref 27–34)
MCHC RBC-ENTMCNC: 31.2 G/DL — LOW (ref 32–36)
MCV RBC AUTO: 96.9 FL — SIGNIFICANT CHANGE UP (ref 80–100)
PLATELET # BLD AUTO: 146 K/UL — LOW (ref 150–400)
POTASSIUM SERPL-MCNC: 3.6 MMOL/L — SIGNIFICANT CHANGE UP (ref 3.5–5.3)
POTASSIUM SERPL-SCNC: 3.6 MMOL/L — SIGNIFICANT CHANGE UP (ref 3.5–5.3)
RBC # BLD: 3.9 M/UL — LOW (ref 4.2–5.8)
RBC # FLD: 13.1 % — SIGNIFICANT CHANGE UP (ref 10.3–14.5)
SODIUM SERPL-SCNC: 141 MMOL/L — SIGNIFICANT CHANGE UP (ref 135–145)
WBC # BLD: 4.58 K/UL — SIGNIFICANT CHANGE UP (ref 3.8–10.5)
WBC # FLD AUTO: 4.58 K/UL — SIGNIFICANT CHANGE UP (ref 3.8–10.5)

## 2024-12-17 PROCEDURE — 99239 HOSP IP/OBS DSCHRG MGMT >30: CPT

## 2024-12-17 RX ORDER — ACETAMINOPHEN, DIPHENHYDRAMINE HCL, PHENYLEPHRINE HCL 325; 25; 5 MG/1; MG/1; MG/1
1 TABLET ORAL
Qty: 0 | Refills: 0 | DISCHARGE
Start: 2024-12-17

## 2024-12-17 RX ADMIN — APIXABAN 5 MILLIGRAM(S): 2.5 TABLET, FILM COATED ORAL at 13:12

## 2024-12-17 NOTE — DISCHARGE NOTE PROVIDER - NSDCMRMEDTOKEN_GEN_ALL_CORE_FT
apixaban 5 mg oral tablet: 1 tab(s) orally 2 times a day  melatonin 3 mg oral tablet: 1 tab(s) orally once a day (at bedtime) As needed Insomnia  predniSONE 5 mg oral tablet: 1 tab(s) orally 2 times a day

## 2024-12-17 NOTE — DISCHARGE NOTE PROVIDER - CARE PROVIDER_API CALL
[facility]  Medical Oncology  [address] [place] [address], [address]  [place] [address]  Phone: [phone]  Fax: [phone]  Follow Up Time:

## 2024-12-17 NOTE — PROGRESS NOTE ADULT - ASSESSMENT
80 yo M pmhx locally advanced prostate cancer, DVT on eliquis, hypotension BIBEMS for AMS    1) Locally advanced prostate ca  follows with Dr Lozano of our practice  Currently on  eligard and transitioning to lupron, recently stopped zytiga due to weakness  last eligard on 12/11  last psa 0.02  t12 lesion seen on our psma pet from 01/24    2) AMS  mental status improving since admission  CTH unremarkable  he has seen outpt neurology who has recommended MRI, will have MRI today    3) Panc lesion  not seen on our pet scan from 01/24  will need outpt MRI    Thank you for the courtesy of this consultation and we will continue to follow. d/w hospitalist    Danny Beth MD  [facility]  Cell: [phone]  
80 yo M pmhx prostate cancer undergoing treatment, DVT on eliquis, hypotension BIBEMS for AMS after wellness check.     #AMS? (resolved)   Pt found during wellness check now at baseline mental status AOX3 though sometimes slow to respond and poor historian unable to remember all his medications. Lives alone.   - CTH unremarkable   - PT/OT eval   - social work consult --> plan for DC TO [facility]    #Hx of DVT   - cw home eliquis     #Hx of Pancreatic cancer   Pt with hx of pancreatic cancer currently undergoing treatment able to state the names of some cancer medications but unable to remember his oncologist.   - heme onc consulted, appreciate recs   - cw home prednisone unclear indication     #Anemia   Baseline hgb unknown likely 2/2 to chronic illness (pancreatic cancer)         #Code: Pt AOx3 would like to be DNR/DNI trial of NIV MOLST form filled out    #dispo pending [facility]  #HCP: friend Regis Fraire    
80 yo M pmhx prostate cancer undergoing treatment, DVT on eliquis, hypotension BIBEMS for AMS after wellness check.     #AMS? (resolved)   Pt found during wellness check now at baseline mental status AOX3 though sometimes slow to respond and poor historian unable to remember all his medications. Lives alone.   - CTH unremarkable   - PT/OT eval   - social work consult --> plan for DC TO [facility]    #Hx of DVT   - cw home eliquis     #Hx of Pancreatic cancer   Pt with hx of pancreatic cancer currently undergoing treatment able to state the names of some cancer medications but unable to remember his oncologist.   - heme onc consulted, appreciate recs   - cw home prednisone unclear indication     #Anemia   Baseline hgb unknown likely 2/2 to chronic illness (pancreatic cancer)   - trend H&H daily  - f/u iron panel     #Hypokalemia   - replete     #Code: Pt AOx3 would like to be DNR/DNI trial of NIV MOLST form filled out    #dispo pending [facility]  #HCP: friend Regis Fraire    
80 yo M pmhx prostate cancer undergoing treatment, DVT on eliquis, hypotension BIBEMS for AMS after wellness check.     #AMS? (resolved)   Pt found during wellness check now at baseline mental status AOX3 though sometimes slow to respond and poor historian unable to remember all his medications. Lives alone.   mental status has been waxing and waning throughout  -onc requesting mr brain w & w/o IV contrast  - CTH unremarkable   - PT/OT eval   - social work consult --> plan for DC TO [facility]    #Hx of DVT   - cw home eliquis     #Hx of Pancreatic cancer   Pt with hx of pancreatic cancer currently undergoing treatment able to state the names of some cancer medications but unable to remember his oncologist.   - heme onc consulted, appreciate recs   - cw home prednisone unclear indication     #Anemia   Baseline hgb unknown likely 2/2 to chronic illness (pancreatic cancer)         #Code: Pt AOx3 would like to be DNR/DNI trial of NIV MOLST form filled out    #dispo pending [facility]  #HCP: friend Regis rFaire

## 2024-12-17 NOTE — DISCHARGE NOTE PROVIDER - NSDCCPCAREPLAN_GEN_ALL_CORE_FT
PRINCIPAL DISCHARGE DIAGNOSIS  Diagnosis: AMS (altered mental status)  Assessment and Plan of Treatment: You presented with confusion and difficulty taking care of yourself at home which you are going to rehab for to help get stronger   It is important to follow up with your oncologist for your treatment and get an MR of your brain as well

## 2024-12-17 NOTE — DISCHARGE NOTE PROVIDER - HOSPITAL COURSE
Vaccine Information Statement(s) was given today. This has been reviewed, questions answered, and verbal consent given by Patient for injection(s) and administration of Influenza (Inactivated).      Patient tolerated without incident. See immunization grid for documentation.         #Discharge: do not delete    78 yo M pmhx prostate cancer undergoing treatment, DVT on eliquis, hypotension BIBEMS for AMS after wellness check. Patient with waxing and waning mental status throughout hospitalization, after further collateral has been ongoing for some time, patient back to baseline during hospitalization. will follow up with oncology outpatient and go to [facility] as patient is not safe at home  to take care of himself     Problem List/Main Diagnoses (system-based):   Inpatient treatment course:     #AMS? (resolved)   Pt found during wellness check now at baseline mental status AOX3 though sometimes slow to respond and poor historian unable to remember all his medications. Lives alone.   mental status has been waxing and waning throughout  -onc requesting mr brain w & w/o IV contrast -- ok to be done outpatient   - CTH unremarkable   - PT/OT eval   - social work consult --> plan for DC [facility]    #Hx of DVT   - cw home eliquis     #Hx of Pancreatic cancer   Pt with hx of pancreatic cancer currently undergoing treatment able to state the names of some cancer medications but unable to remember his oncologist.   - heme onc consulted, appreciate recs   - cw home prednisone unclear indication     #Anemia   Baseline hgb unknown likely 2/2 to chronic illness (pancreatic cancer) has hemorrhoids suspected as well    New medications: none   Exam to be followed outpatient: MR brain

## 2024-12-17 NOTE — DISCHARGE NOTE NURSING/CASE MANAGEMENT/SOCIAL WORK - PATIENT PORTAL LINK FT
You can access the FollowMyHealth Patient Portal offered by [facility] by registering at the following website: [email or web address]. By joining [employer]’s FollowMyHealth portal, you will also be able to view your health information using other applications (apps) compatible with our system.

## 2024-12-17 NOTE — DISCHARGE NOTE PROVIDER - NSTOBACCOUSAGEY/N_GEN_A_CS
- SBP goal <180, prn labetalol ordered  - Has not required PRNS  - Now on metoprolol 50mg BID   - Lisinopril 20mg QD  - Norvasc 5mg QD  - has had multiple short runs of NSVT - patient unable to provide review if symptomatic     No

## 2024-12-17 NOTE — DISCHARGE NOTE PROVIDER - DISCHARGE DIET
Take increased dose of furosemide 40 mg for 3 days for the leg swelling.  Stop sooner if you're getting dizzy/lightheaded or feeling dehydrated.    Follow-up with your primary care doctor for recheck.    Return to the ER if you have spreading redness, worsening swelling despite the medication, new or worsened difficulty breathing, fevers or any other worsening symptoms.     Regular Diet - No restrictions

## 2024-12-17 NOTE — DISCHARGE NOTE NURSING/CASE MANAGEMENT/SOCIAL WORK - FINANCIAL ASSISTANCE
[facility] provides services at a reduced cost to those who are determined to be eligible through [facility]’s financial assistance program. Information regarding [facility]’s financial assistance program can be found by going to [email or web address] or by calling [phone].

## 2024-12-17 NOTE — PROGRESS NOTE ADULT - SUBJECTIVE AND OBJECTIVE BOX
CC: Patient is a 79y old  Male who presents with a chief complaint of Altered mental status     (14 Dec 2024 12:05)      INTERVAL EVENTS: ZULY    SUBJECTIVE / INTERVAL HPI: Patient seen and examined at bedside. Patient states the rainy weather is making him feel sad, but has no other complaints     ROS: negative unless otherwise stated above.    VITAL SIGNS:  Vital Signs Last 24 Hrs  T(C): 36.9 (16 Dec 2024 07:30), Max: 36.9 (15 Dec 2024 14:54)  T(F): 98.5 (16 Dec 2024 07:30), Max: 98.5 (16 Dec 2024 07:30)  HR: 72 (16 Dec 2024 07:30) (72 - 72)  BP: 122/61 (16 Dec 2024 07:30) (106/42 - 122/61)  BP(mean): 58 (15 Dec 2024 22:10) (58 - 58)  RR: 18 (16 Dec 2024 07:30) (18 - 18)  SpO2: 100% (16 Dec 2024 07:30) (100% - 100%)    Parameters below as of 16 Dec 2024 07:30  Patient On (Oxygen Delivery Method): room air            PHYSICAL EXAM:    General: NAD  HEENT: MMM  Neck: supple  Cardiovascular: +S1/S2; RRR  Respiratory: CTA B/L; no W/R/R  Gastrointestinal: soft, NT/ND  Extremities: WWP; no edema, clubbing or cyanosis  Vascular: 2+ radial, DP/PT pulses B/L  Neurological: AAOx3; no focal deficits    MEDICATIONS:  MEDICATIONS  (STANDING):  apixaban 5 milliGRAM(s) Oral every 12 hours  influenza  Vaccine (HIGH DOSE) 0.5 milliLiter(s) IntraMuscular once  predniSONE   Tablet 5 milliGRAM(s) Oral two times a day    MEDICATIONS  (PRN):  acetaminophen     Tablet .. 650 milliGRAM(s) Oral every 6 hours PRN Temp greater or equal to 38C (100.4F), Mild Pain (1 - 3)  aluminum hydroxide/magnesium hydroxide/simethicone Suspension 30 milliLiter(s) Oral every 4 hours PRN Dyspepsia  melatonin 3 milliGRAM(s) Oral at bedtime PRN Insomnia  ondansetron Injectable 4 milliGRAM(s) IV Push every 8 hours PRN Nausea and/or Vomiting      ALLERGIES:  Allergies    Allergy Status Unknown    Intolerances        LABS:              CAPILLARY BLOOD GLUCOSE          RADIOLOGY & ADDITIONAL TESTS: Reviewed.
Pt seen, sitting upright, reports somewhat improved mental status, going for MRI brain      MEDICATIONS  (STANDING):  apixaban 5 milliGRAM(s) Oral every 12 hours  influenza  Vaccine (HIGH DOSE) 0.5 milliLiter(s) IntraMuscular once  predniSONE   Tablet 5 milliGRAM(s) Oral two times a day    MEDICATIONS  (PRN):  acetaminophen     Tablet .. 650 milliGRAM(s) Oral every 6 hours PRN Temp greater or equal to 38C (100.4F), Mild Pain (1 - 3)  aluminum hydroxide/magnesium hydroxide/simethicone Suspension 30 milliLiter(s) Oral every 4 hours PRN Dyspepsia  melatonin 3 milliGRAM(s) Oral at bedtime PRN Insomnia  ondansetron Injectable 4 milliGRAM(s) IV Push every 8 hours PRN Nausea and/or Vomiting      ROS  No fever, sweats, chills  No epistaxis, HA, sore throat       Vital Signs Last 24 Hrs  T(C): 36.3 (17 Dec 2024 08:00), Max: 36.7 (16 Dec 2024 15:56)  T(F): 97.3 (17 Dec 2024 08:00), Max: 98.1 (16 Dec 2024 15:56)  HR: 71 (17 Dec 2024 08:00) (71 - 90)  BP: 120/53 (17 Dec 2024 08:00) (100/51 - 120/53)  BP(mean): 64 (16 Dec 2024 23:20) (64 - 64)  RR: 17 (17 Dec 2024 08:00) (16 - 18)  SpO2: 100% (17 Dec 2024 08:00) (99% - 100%)    Parameters below as of 17 Dec 2024 08:00  Patient On (Oxygen Delivery Method): room air        PE  NAD  Awake, alert     No rash grossly  FROM                          11.8   4.58  )-----------( 146      ( 17 Dec 2024 10:42 )             37.8       12-17    141  |  107  |  20  ----------------------------<  118[H]  3.6   |  31  |  0.63    Ca    9.2      17 Dec 2024 10:42        
CC: Patient is a 79y old  Male who presents with a chief complaint of Altered mental status     (14 Dec 2024 12:05)      INTERVAL EVENTS: ZULY    SUBJECTIVE / INTERVAL HPI: Patient seen and examined at bedside. Patient slightly agitated today, but redirectable - has no complaints    ROS: negative unless otherwise stated above.    VITAL SIGNS:  Vital Signs Last 24 Hrs  T(C): 36.7 (15 Dec 2024 07:32), Max: 36.8 (14 Dec 2024 14:57)  T(F): 98.1 (15 Dec 2024 07:32), Max: 98.2 (14 Dec 2024 14:57)  HR: 79 (15 Dec 2024 07:32) (78 - 81)  BP: 104/50 (15 Dec 2024 07:32) (104/50 - 116/55)  BP(mean): 67 (15 Dec 2024 00:20) (67 - 67)  RR: 19 (15 Dec 2024 07:32) (16 - 19)  SpO2: 100% (15 Dec 2024 07:32) (97% - 100%)    Parameters below as of 15 Dec 2024 07:32  Patient On (Oxygen Delivery Method): room air            PHYSICAL EXAM:    General: NAD  HEENT: MMM  Neck: supple  Cardiovascular: +S1/S2; RRR  Respiratory: CTA B/L; no W/R/R  Gastrointestinal: soft, NT/ND  Extremities: b/l extensive venous stasis changes seen  Vascular: 2+ radial, DP/PT pulses B/L  Neurological: AAOx3; no focal deficits    MEDICATIONS:  MEDICATIONS  (STANDING):  apixaban 5 milliGRAM(s) Oral every 12 hours  influenza  Vaccine (HIGH DOSE) 0.5 milliLiter(s) IntraMuscular once  predniSONE   Tablet 5 milliGRAM(s) Oral two times a day    MEDICATIONS  (PRN):  acetaminophen     Tablet .. 650 milliGRAM(s) Oral every 6 hours PRN Temp greater or equal to 38C (100.4F), Mild Pain (1 - 3)  aluminum hydroxide/magnesium hydroxide/simethicone Suspension 30 milliLiter(s) Oral every 4 hours PRN Dyspepsia  melatonin 3 milliGRAM(s) Oral at bedtime PRN Insomnia  ondansetron Injectable 4 milliGRAM(s) IV Push every 8 hours PRN Nausea and/or Vomiting      ALLERGIES:  Allergies    Allergy Status Unknown    Intolerances        LABS:                        10.8   6.70  )-----------( 107      ( 14 Dec 2024 07:26 )             33.7     12-14    142  |  110[H]  |  17  ----------------------------<  107[H]  3.7   |  30  |  0.60    Ca    9.3      14 Dec 2024 07:26    TPro  5.5[L]  /  Alb  2.4[L]  /  TBili  0.8  /  DBili  x   /  AST  90[H]  /  ALT  38  /  AlkPhos  49  12-14      Urinalysis Basic - ( 14 Dec 2024 07:26 )    Color: x / Appearance: x / SG: x / pH: x  Gluc: 107 mg/dL / Ketone: x  / Bili: x / Urobili: x   Blood: x / Protein: x / Nitrite: x   Leuk Esterase: x / RBC: x / WBC x   Sq Epi: x / Non Sq Epi: x / Bacteria: x      CAPILLARY BLOOD GLUCOSE          RADIOLOGY & ADDITIONAL TESTS: Reviewed.
CC: Patient is a 79y old  Male who presents with a chief complaint of Altered mental status     (14 Dec 2024 12:05)      INTERVAL EVENTS: ZULY    SUBJECTIVE / INTERVAL HPI: Patient seen and examined at bedside. Patient with no complaints this morning     ROS: negative unless otherwise stated above.    VITAL SIGNS:  Vital Signs Last 24 Hrs  T(C): 36.7 (14 Dec 2024 08:58), Max: 36.7 (14 Dec 2024 08:58)  T(F): 98.1 (14 Dec 2024 08:58), Max: 98.1 (14 Dec 2024 08:58)  HR: 72 (14 Dec 2024 08:58) (70 - 85)  BP: 115/53 (14 Dec 2024 08:58) (115/53 - 133/74)  BP(mean): 67 (14 Dec 2024 08:58) (67 - 67)  RR: 19 (14 Dec 2024 08:58) (16 - 19)  SpO2: 100% (14 Dec 2024 08:58) (96% - 100%)    Parameters below as of 13 Dec 2024 22:00  Patient On (Oxygen Delivery Method): room air            PHYSICAL EXAM:    General: NAD  HEENT: MMM  Neck: supple  Cardiovascular: +S1/S2; RRR  Respiratory: CTA B/L; no W/R/R  Gastrointestinal: soft, NT/ND  Extremities: LE ext venous stasis changes seen   Vascular: 2+ radial, DP/PT pulses B/L  Neurological: AAOx3; no focal deficits    MEDICATIONS:  MEDICATIONS  (STANDING):  apixaban 5 milliGRAM(s) Oral every 12 hours  influenza  Vaccine (HIGH DOSE) 0.5 milliLiter(s) IntraMuscular once  predniSONE   Tablet 5 milliGRAM(s) Oral two times a day    MEDICATIONS  (PRN):  acetaminophen     Tablet .. 650 milliGRAM(s) Oral every 6 hours PRN Temp greater or equal to 38C (100.4F), Mild Pain (1 - 3)  aluminum hydroxide/magnesium hydroxide/simethicone Suspension 30 milliLiter(s) Oral every 4 hours PRN Dyspepsia  melatonin 3 milliGRAM(s) Oral at bedtime PRN Insomnia  ondansetron Injectable 4 milliGRAM(s) IV Push every 8 hours PRN Nausea and/or Vomiting      ALLERGIES:  Allergies    Allergy Status Unknown    Intolerances        LABS:                        10.8   6.70  )-----------( 107      ( 14 Dec 2024 07:26 )             33.7     12-14    142  |  110[H]  |  17  ----------------------------<  107[H]  3.7   |  30  |  0.60    Ca    9.3      14 Dec 2024 07:26    TPro  5.5[L]  /  Alb  2.4[L]  /  TBili  0.8  /  DBili  x   /  AST  90[H]  /  ALT  38  /  AlkPhos  49  12-14    PT/INR - ( 13 Dec 2024 01:46 )   PT: 14.6 sec;   INR: 1.24 ratio         PTT - ( 13 Dec 2024 01:46 )  PTT:29.1 sec  Urinalysis Basic - ( 14 Dec 2024 07:26 )    Color: x / Appearance: x / SG: x / pH: x  Gluc: 107 mg/dL / Ketone: x  / Bili: x / Urobili: x   Blood: x / Protein: x / Nitrite: x   Leuk Esterase: x / RBC: x / WBC x   Sq Epi: x / Non Sq Epi: x / Bacteria: x      CAPILLARY BLOOD GLUCOSE      POCT Blood Glucose.: 101 mg/dL (12 Dec 2024 23:51)      RADIOLOGY & ADDITIONAL TESTS: Reviewed.

## 2024-12-17 NOTE — DISCHARGE NOTE PROVIDER - DETAILS OF MALNUTRITION DIAGNOSIS/DIAGNOSES
This patient has been assessed with a concern for Malnutrition and was treated during this hospitalization for the following Nutrition diagnosis/diagnoses:     -  12/14/2024: Severe protein-calorie malnutrition

## 2024-12-18 LAB
CULTURE RESULTS: SIGNIFICANT CHANGE UP
CULTURE RESULTS: SIGNIFICANT CHANGE UP
SPECIMEN SOURCE: SIGNIFICANT CHANGE UP
SPECIMEN SOURCE: SIGNIFICANT CHANGE UP

## 2024-12-24 DIAGNOSIS — D63.0 ANEMIA IN NEOPLASTIC DISEASE: ICD-10-CM

## 2024-12-24 DIAGNOSIS — I95.9 HYPOTENSION, UNSPECIFIED: ICD-10-CM

## 2024-12-24 DIAGNOSIS — C61 MALIGNANT NEOPLASM OF PROSTATE: ICD-10-CM

## 2024-12-24 DIAGNOSIS — R41.82 ALTERED MENTAL STATUS, UNSPECIFIED: ICD-10-CM

## 2024-12-24 DIAGNOSIS — Z66 DO NOT RESUSCITATE: ICD-10-CM

## 2024-12-24 DIAGNOSIS — C25.9 MALIGNANT NEOPLASM OF PANCREAS, UNSPECIFIED: ICD-10-CM

## 2024-12-24 DIAGNOSIS — E87.6 HYPOKALEMIA: ICD-10-CM

## 2024-12-24 DIAGNOSIS — E43 UNSPECIFIED SEVERE PROTEIN-CALORIE MALNUTRITION: ICD-10-CM
